# Patient Record
Sex: MALE | Race: WHITE | Employment: UNEMPLOYED | URBAN - METROPOLITAN AREA
[De-identification: names, ages, dates, MRNs, and addresses within clinical notes are randomized per-mention and may not be internally consistent; named-entity substitution may affect disease eponyms.]

---

## 2017-03-17 ENCOUNTER — HOSPITAL ENCOUNTER (OUTPATIENT)
Age: 42
Setting detail: SPECIMEN
Discharge: HOME | End: 2017-03-17
Payer: MEDICAID

## 2017-03-17 LAB
ALBUMIN SERPL-MCNC: 4.6 G/DL (ref 3.5–5.2)
ALBUMIN/GLOBULIN RATIO: 1.4 (ref 1–2.5)
ALP BLD-CCNC: 147 U/L (ref 40–129)
ALT SERPL-CCNC: 24 U/L (ref 5–41)
ANION GAP SERPL CALCULATED.3IONS-SCNC: 18 MMOL/L (ref 9–17)
AST SERPL-CCNC: 25 U/L
BILIRUB SERPL-MCNC: 0.48 MG/DL (ref 0.3–1.2)
BUN BLDV-MCNC: 11 MG/DL (ref 6–20)
BUN/CREAT BLD: ABNORMAL (ref 9–20)
CALCIUM SERPL-MCNC: 10 MG/DL (ref 8.6–10.4)
CARBAMAZEPINE DATE LAST DOSE: NORMAL
CARBAMAZEPINE DOSE AMOUNT: NORMAL
CARBAMAZEPINE DOSE TIME: NORMAL
CARBAMAZEPINE LEVEL: 10.3 UG/ML (ref 4–12)
CHLORIDE BLD-SCNC: 103 MMOL/L (ref 98–107)
CO2: 27 MMOL/L (ref 20–31)
CREAT SERPL-MCNC: 0.68 MG/DL (ref 0.7–1.2)
GFR AFRICAN AMERICAN: >60 ML/MIN
GFR NON-AFRICAN AMERICAN: >60 ML/MIN
GFR SERPL CREATININE-BSD FRML MDRD: ABNORMAL ML/MIN/{1.73_M2}
GFR SERPL CREATININE-BSD FRML MDRD: ABNORMAL ML/MIN/{1.73_M2}
GLUCOSE BLD-MCNC: 87 MG/DL (ref 70–99)
HCT VFR BLD CALC: 46.8 % (ref 41–53)
HEMOGLOBIN: 15.6 G/DL (ref 13.5–17.5)
MCH RBC QN AUTO: 29.4 PG (ref 26–34)
MCHC RBC AUTO-ENTMCNC: 33.3 G/DL (ref 31–37)
MCV RBC AUTO: 88.3 FL (ref 80–100)
PDW BLD-RTO: 13.6 % (ref 12.5–15.4)
PLATELET # BLD: 278 K/UL (ref 140–450)
PMV BLD AUTO: 9.1 FL (ref 6–12)
POTASSIUM SERPL-SCNC: 5.6 MMOL/L (ref 3.7–5.3)
RBC # BLD: 5.3 M/UL (ref 4.5–5.9)
SODIUM BLD-SCNC: 148 MMOL/L (ref 135–144)
TOTAL PROTEIN: 8 G/DL (ref 6.4–8.3)
WBC # BLD: 5 K/UL (ref 3.5–11)

## 2017-03-17 PROCEDURE — 80053 COMPREHEN METABOLIC PANEL: CPT

## 2017-03-17 PROCEDURE — 80156 ASSAY CARBAMAZEPINE TOTAL: CPT

## 2017-03-17 PROCEDURE — 36415 COLL VENOUS BLD VENIPUNCTURE: CPT

## 2017-03-17 PROCEDURE — P9603 ONE-WAY ALLOW PRORATED MILES: HCPCS

## 2017-03-17 PROCEDURE — 85027 COMPLETE CBC AUTOMATED: CPT

## 2017-07-18 ENCOUNTER — HOSPITAL ENCOUNTER (OUTPATIENT)
Age: 42
Setting detail: SPECIMEN
Discharge: HOME | End: 2017-07-18
Payer: MEDICAID

## 2017-07-18 LAB
ALBUMIN SERPL-MCNC: 4.5 G/DL (ref 3.5–5.2)
ALBUMIN/GLOBULIN RATIO: 1.3 (ref 1–2.5)
ALP BLD-CCNC: 152 U/L (ref 40–129)
ALT SERPL-CCNC: 22 U/L (ref 5–41)
ANION GAP SERPL CALCULATED.3IONS-SCNC: 14 MMOL/L (ref 9–17)
AST SERPL-CCNC: 25 U/L
BILIRUB SERPL-MCNC: 0.42 MG/DL (ref 0.3–1.2)
BUN BLDV-MCNC: 11 MG/DL (ref 6–20)
BUN/CREAT BLD: ABNORMAL (ref 9–20)
CALCIUM SERPL-MCNC: 9.8 MG/DL (ref 8.6–10.4)
CARBAMAZEPINE DATE LAST DOSE: NORMAL
CARBAMAZEPINE DOSE AMOUNT: NORMAL
CARBAMAZEPINE DOSE TIME: NORMAL
CARBAMAZEPINE LEVEL: 10.4 UG/ML (ref 4–12)
CHLORIDE BLD-SCNC: 101 MMOL/L (ref 98–107)
CO2: 27 MMOL/L (ref 20–31)
CREAT SERPL-MCNC: 0.65 MG/DL (ref 0.7–1.2)
GFR AFRICAN AMERICAN: >60 ML/MIN
GFR NON-AFRICAN AMERICAN: >60 ML/MIN
GFR SERPL CREATININE-BSD FRML MDRD: ABNORMAL ML/MIN/{1.73_M2}
GFR SERPL CREATININE-BSD FRML MDRD: ABNORMAL ML/MIN/{1.73_M2}
GLUCOSE BLD-MCNC: 76 MG/DL (ref 70–99)
HCT VFR BLD CALC: 45.9 % (ref 41–53)
HEMOGLOBIN: 15 G/DL (ref 13.5–17.5)
MCH RBC QN AUTO: 29.3 PG (ref 26–34)
MCHC RBC AUTO-ENTMCNC: 32.7 G/DL (ref 31–37)
MCV RBC AUTO: 89.8 FL (ref 80–100)
PDW BLD-RTO: 13.4 % (ref 12.5–15.4)
PLATELET # BLD: 276 K/UL (ref 140–450)
PMV BLD AUTO: 9.3 FL (ref 6–12)
POTASSIUM SERPL-SCNC: 5.6 MMOL/L (ref 3.7–5.3)
RBC # BLD: 5.11 M/UL (ref 4.5–5.9)
SODIUM BLD-SCNC: 142 MMOL/L (ref 135–144)
TOTAL PROTEIN: 8 G/DL (ref 6.4–8.3)
WBC # BLD: 4.8 K/UL (ref 3.5–11)

## 2017-07-18 PROCEDURE — 80156 ASSAY CARBAMAZEPINE TOTAL: CPT

## 2017-07-18 PROCEDURE — 80053 COMPREHEN METABOLIC PANEL: CPT

## 2017-07-18 PROCEDURE — 85027 COMPLETE CBC AUTOMATED: CPT

## 2017-07-18 PROCEDURE — 36415 COLL VENOUS BLD VENIPUNCTURE: CPT

## 2017-07-21 ENCOUNTER — HOSPITAL ENCOUNTER (OUTPATIENT)
Age: 42
Setting detail: SPECIMEN
Discharge: HOME | End: 2017-07-21
Payer: MEDICAID

## 2017-07-21 LAB — POTASSIUM SERPL-SCNC: 5 MMOL/L (ref 3.7–5.3)

## 2017-07-21 PROCEDURE — 36415 COLL VENOUS BLD VENIPUNCTURE: CPT

## 2017-07-21 PROCEDURE — 84132 ASSAY OF SERUM POTASSIUM: CPT

## 2017-08-29 ENCOUNTER — HOSPITAL ENCOUNTER (OUTPATIENT)
Age: 42
Setting detail: SPECIMEN
Discharge: HOME | End: 2017-08-29
Payer: MEDICAID

## 2017-08-29 LAB — POTASSIUM SERPL-SCNC: 4 MMOL/L (ref 3.7–5.3)

## 2017-08-29 PROCEDURE — P9603 ONE-WAY ALLOW PRORATED MILES: HCPCS

## 2017-08-29 PROCEDURE — 84132 ASSAY OF SERUM POTASSIUM: CPT

## 2017-08-29 PROCEDURE — 36415 COLL VENOUS BLD VENIPUNCTURE: CPT

## 2017-11-21 ENCOUNTER — HOSPITAL ENCOUNTER (OUTPATIENT)
Age: 42
Setting detail: SPECIMEN
Discharge: HOME | End: 2017-11-21
Payer: MEDICAID

## 2017-11-21 LAB
ALBUMIN SERPL-MCNC: 4 G/DL (ref 3.5–5.2)
ALBUMIN/GLOBULIN RATIO: 1.1 (ref 1–2.5)
ALP BLD-CCNC: 123 U/L (ref 40–129)
ALT SERPL-CCNC: 21 U/L (ref 5–41)
ANION GAP SERPL CALCULATED.3IONS-SCNC: 16 MMOL/L (ref 9–17)
AST SERPL-CCNC: 33 U/L
BILIRUB SERPL-MCNC: 0.39 MG/DL (ref 0.3–1.2)
BUN BLDV-MCNC: 9 MG/DL (ref 6–20)
BUN/CREAT BLD: ABNORMAL (ref 9–20)
CALCIUM SERPL-MCNC: 9.2 MG/DL (ref 8.6–10.4)
CARBAMAZEPINE DATE LAST DOSE: ABNORMAL
CARBAMAZEPINE DOSE AMOUNT: ABNORMAL
CARBAMAZEPINE DOSE TIME: ABNORMAL
CARBAMAZEPINE LEVEL: 12.7 UG/ML (ref 4–12)
CHLORIDE BLD-SCNC: 101 MMOL/L (ref 98–107)
CO2: 22 MMOL/L (ref 20–31)
CREAT SERPL-MCNC: 0.52 MG/DL (ref 0.7–1.2)
GFR AFRICAN AMERICAN: >60 ML/MIN
GFR NON-AFRICAN AMERICAN: >60 ML/MIN
GFR SERPL CREATININE-BSD FRML MDRD: ABNORMAL ML/MIN/{1.73_M2}
GFR SERPL CREATININE-BSD FRML MDRD: ABNORMAL ML/MIN/{1.73_M2}
GLUCOSE BLD-MCNC: 78 MG/DL (ref 70–99)
HCT VFR BLD CALC: 46 % (ref 40.7–50.3)
HEMOGLOBIN: 13.9 G/DL (ref 13–17)
MCH RBC QN AUTO: 30.1 PG (ref 25.2–33.5)
MCHC RBC AUTO-ENTMCNC: 30.2 G/DL (ref 28.4–34.8)
MCV RBC AUTO: 99.6 FL (ref 82.6–102.9)
PDW BLD-RTO: 12.3 % (ref 11.8–14.4)
PLATELET # BLD: 235 K/UL (ref 138–453)
PMV BLD AUTO: 11.1 FL (ref 8.1–13.5)
POTASSIUM SERPL-SCNC: 5 MMOL/L (ref 3.7–5.3)
RBC # BLD: 4.62 M/UL (ref 4.21–5.77)
SODIUM BLD-SCNC: 139 MMOL/L (ref 135–144)
TOTAL PROTEIN: 7.5 G/DL (ref 6.4–8.3)
WBC # BLD: 4.1 K/UL (ref 3.5–11.3)

## 2017-11-21 PROCEDURE — 80053 COMPREHEN METABOLIC PANEL: CPT

## 2017-11-21 PROCEDURE — 36415 COLL VENOUS BLD VENIPUNCTURE: CPT

## 2017-11-21 PROCEDURE — 85027 COMPLETE CBC AUTOMATED: CPT

## 2017-11-21 PROCEDURE — P9603 ONE-WAY ALLOW PRORATED MILES: HCPCS

## 2017-11-21 PROCEDURE — 80156 ASSAY CARBAMAZEPINE TOTAL: CPT

## 2018-01-30 ENCOUNTER — OFFICE VISIT (OUTPATIENT)
Dept: PEDIATRIC NEUROLOGY | Age: 43
End: 2018-01-30
Payer: MEDICAID

## 2018-01-30 VITALS — HEIGHT: 78 IN | BODY MASS INDEX: 16.29 KG/M2 | WEIGHT: 140.8 LBS

## 2018-01-30 DIAGNOSIS — R25.2 SPASTICITY: ICD-10-CM

## 2018-01-30 DIAGNOSIS — G80.3 CHOREOATHETOID CEREBRAL PALSY (HCC): ICD-10-CM

## 2018-01-30 DIAGNOSIS — G40.909 NONINTRACTABLE EPILEPSY WITHOUT STATUS EPILEPTICUS, UNSPECIFIED EPILEPSY TYPE (HCC): Primary | ICD-10-CM

## 2018-01-30 PROCEDURE — 99214 OFFICE O/P EST MOD 30 MIN: CPT

## 2018-01-30 PROCEDURE — 99215 OFFICE O/P EST HI 40 MIN: CPT | Performed by: PSYCHIATRY & NEUROLOGY

## 2018-01-30 NOTE — LETTER
developmental delays. Hematological: Negative. Psychiatric/Behavioral: Negative for behavioral issues, Negative for ADHD     All other systems reviewed and are negative. Past, social, family, and developmental history was reviewed and unchanged. OBJECTIVE:   PHYSICAL EXAM:  Ht (!) 8' 2.42\" (2.5 m)   Wt 140 lb 12.8 oz (63.9 kg)   BMI 10.22 kg/m²    Neurological: he is alert. he displays no atrophy, no tremor. No cranial nerve deficit. he displays no seizure activity. Deisy Cruz was sitting in his wheelchair and making unintelligible noises during today's visit. He was compliant with his examination. Mild amount of spasticity was noted all over in the upper extremities with no restriction of range of motion. He was able to move his hands above his shoulder level. He was able to lift his lower extremities in the air, but there was significant spasticity noted in the hamstrings which made it difficult to extend the lower extremity. He continued to exhibits some lip smacking movements and smiled at the examiners. Reflex Scores: 3+ diffuse. No focal weakness noted on exam.    Nursing note and vitals reviewed. Constitutional: he appears well-developed and well-nourished. HENT: Mouth/Throat: Mucous membranes are moist.   Eyes: EOM are normal. Pupils are equal, round, and reactive to light. Fundoscopic exam reveals sharp discs bilaterally. Neck: Normal range of motion. Neck supple. Cardiovascular: Regular rhythm, S1 normal and S2 normal.   Pulmonary/Chest: Effort normal and breath sounds normal.   Lymph Nodes: No significant lymphadenopathy noted. Musculoskeletal: Normal range of motion. Neurological: he is alert and rest of the exam is as mentioned above. Skin: Skin is warm and dry. No lesions or ulcers. RECORD REVIEW: Previous medical records were reviewed at today's visit.   DIAGNOSTIC STUDIES:  11/15/2015 - EEG - This is an abnormal EEG due to generalized slowing as AST Latest Ref Range: <40 U/L 33   Bilirubin Latest Ref Range: 0.3 - 1.2 mg/dL 0.39   Carbamazepine Lvl Latest Ref Range: 4.0 - 12.0 ug/mL 12.7 (H)   WBC Latest Ref Range: 3.5 - 11.3 k/uL 4.1   RBC Latest Ref Range: 4.21 - 5.77 m/uL 4.62   Hemoglobin Quant Latest Ref Range: 13.0 - 17.0 g/dL 13.9   Hematocrit Latest Ref Range: 40.7 - 50.3 % 46.0   Platelet Count Latest Ref Range: 138 - 453 k/uL 235     ***CSM    ASSESSMENT:   Aline Santiago is a 36 y.o. male with:  1. Epilepsy with the last reported seizure occurring in May 1994.   2. Choreoathetoid cerebral palsy   3. Static Encephalopathy  4. Severe Spastic Quadriparesis  5. Spasticity     PLAN:   1. Continue Tegretol at 800 mg twice daily. 2. Continue Zanaflex at 4 mg three times a day. 3. The use of Diastat 12.5 mg to be administered rectally for seizures lasting more than 3 minutes was discussed with the caregiver. 4. I would like to get an EEG to evaluate for epileptiform activity. 5. Blood work including CBC, AST, ALT, Lytes and Tegretol levels is also recommended every 4 months. 6. Seizure precautions were recommended to be maintained. The caregiver was instructed to notify our clinic if Maddi Mars has any breakthrough seizures for an earlier appointment. 7. Anticipatory guidance and preventative counseling was provided regarding seizure precautions; and first aid measures during seizures was discussed in detail. 8. I plan to see Maddi Mars back in 6 months or earlier if needed. Written by Marycruz Jean RN acting as scribe for Dr. Naina Rashid. 1/30/2018  2:00 PM       I have reviewed and made changes accordingly to the work scribed by Marycruz Jean RN. The documentation accurately reflects work and decisions made by me. Esdras Rollins MD   Pediatric Neurology & Epilepsy  1/30/2018              If you have any questions or concerns, please feel free to call me. Thank you again for referring this patient to be seen in our clinic.     Sincerely,

## 2018-01-30 NOTE — PROGRESS NOTES
Hematocrit Latest Ref Range: 40.7 - 50.3 % 46.0   Platelet Count Latest Ref Range: 138 - 453 k/uL 235     Controlled Substances Monitoring:     Attestation: The Prescription Monitoring Report for this patient was reviewed today. (Efra Balbuena MD)  Documentation: No signs of potential drug abuse or diversion identified. Efra Balbuena MD)    ASSESSMENT:   Minh Lara is a 36 y.o. male with:  1. Epilepsy with the last reported seizure occurring in May 1994.   2. Choreoathetoid cerebral palsy   3. Static Encephalopathy  4. Severe Spastic Quadriparesis  5. Spasticity, which remains a concern. PLAN:   1. Continue Tegretol at 800 mg twice daily. 2. Continue Zanaflex but increase the dose to 6 mg three times a day since the spasticity continues to remain a concern. 3. The use of Diastat 12.5 mg to be administered rectally for seizures lasting more than 3 minutes was discussed with the caregiver. 4. I would like to get an EEG to evaluate for epileptiform activity. 5. Blood work including CBC, AST, ALT, Lytes and Tegretol levels is also recommended every 4 months. 6. Seizure precautions were recommended to be maintained. The caregiver was instructed to notify our clinic if Chip Maki has any breakthrough seizures for an earlier appointment. 7. Anticipatory guidance and preventative counseling was provided regarding seizure precautions; and first aid measures during seizures was discussed in detail. 8. I plan to see Chip Maki back in 6 months or earlier if needed. Written by Malia Hughes RN acting as scribe for Dr. Laurel Raymond. 1/30/2018  2:00 PM       I have reviewed and made changes accordingly to the work scribed by Malia Hughes RN. The documentation accurately reflects work and decisions made by me.     Dante Morgan MD   Pediatric Neurology & Epilepsy  1/30/2018

## 2018-01-30 NOTE — LETTER
Neurological: Negative for headaches, positive for seizures, positive for developmental delays. Hematological: Negative. Psychiatric/Behavioral: Negative for behavioral issues, Negative for ADHD     All other systems reviewed and are negative. Past, social, family, and developmental history was reviewed and unchanged. OBJECTIVE:   PHYSICAL EXAM:  Ht (!) 8' 2.42\" (2.5 m)   Wt 140 lb 12.8 oz (63.9 kg)   BMI 10.22 kg/m²    Neurological: he is alert. he displays no atrophy, no tremor. No cranial nerve deficit. he displays no seizure activity. Mild amount of spasticity was noted all over in the upper extremities with no restriction of range of motion. He was able to move his hands above his shoulder level. He was able to lift his lower extremities in the air, but there was significant spasticity noted in the hamstrings which made it difficult to extend the lower extremity. He continued to exhibits some lip smacking movements and smiled at the examiners. The tone was noted to be increased in the extremities. He made unintelligible sounds and shouted sometimes during the visit. Deisy Cruz was sitting in his wheelchair. Reflex Scores: 3+ diffuse. No focal weakness noted on exam.    Nursing note and vitals reviewed. Constitutional: he appears well-developed and well-nourished. HENT: Mouth/Throat: Mucous membranes are moist.   Eyes: EOM are normal. Pupils are equal, round, and reactive to light. Fundoscopic exam reveals sharp discs bilaterally. Neck: Normal range of motion. Neck supple. Cardiovascular: Regular rhythm, S1 normal and S2 normal.   Pulmonary/Chest: Effort normal and breath sounds normal.   Lymph Nodes: No significant lymphadenopathy noted. Musculoskeletal: Normal range of motion. Neurological: he is alert and rest of the exam is as mentioned above. Skin: Skin is warm and dry. No lesions or ulcers. RECORD REVIEW: Previous medical records were reviewed at today's visit.

## 2018-03-01 ENCOUNTER — PROCEDURE VISIT (OUTPATIENT)
Dept: PEDIATRIC NEUROLOGY | Age: 43
End: 2018-03-01
Payer: MEDICAID

## 2018-03-01 DIAGNOSIS — G40.909 NONINTRACTABLE EPILEPSY WITHOUT STATUS EPILEPTICUS, UNSPECIFIED EPILEPSY TYPE (HCC): Primary | Chronic | ICD-10-CM

## 2018-03-01 PROCEDURE — 95816 EEG AWAKE AND DROWSY: CPT | Performed by: PSYCHIATRY & NEUROLOGY

## 2018-03-02 ENCOUNTER — TELEPHONE (OUTPATIENT)
Dept: PEDIATRIC NEUROLOGY | Age: 43
End: 2018-03-02

## 2018-03-23 ENCOUNTER — HOSPITAL ENCOUNTER (OUTPATIENT)
Age: 43
Setting detail: SPECIMEN
Discharge: HOME | End: 2018-03-23
Payer: MEDICAID

## 2018-03-23 LAB
ALBUMIN SERPL-MCNC: 4.3 G/DL (ref 3.5–5.2)
ALBUMIN/GLOBULIN RATIO: 1.2 (ref 1–2.5)
ALP BLD-CCNC: 118 U/L (ref 40–129)
ALT SERPL-CCNC: 23 U/L (ref 5–41)
ANION GAP SERPL CALCULATED.3IONS-SCNC: 14 MMOL/L (ref 9–17)
AST SERPL-CCNC: 28 U/L
BILIRUB SERPL-MCNC: 0.41 MG/DL (ref 0.3–1.2)
BUN BLDV-MCNC: 12 MG/DL (ref 6–20)
BUN/CREAT BLD: ABNORMAL (ref 9–20)
CALCIUM SERPL-MCNC: 9.5 MG/DL (ref 8.6–10.4)
CARBAMAZEPINE DATE LAST DOSE: NORMAL
CARBAMAZEPINE DOSE AMOUNT: NORMAL
CARBAMAZEPINE DOSE TIME: NORMAL
CARBAMAZEPINE LEVEL: 9.1 UG/ML (ref 4–12)
CHLORIDE BLD-SCNC: 103 MMOL/L (ref 98–107)
CO2: 27 MMOL/L (ref 20–31)
CREAT SERPL-MCNC: 0.62 MG/DL (ref 0.7–1.2)
GFR AFRICAN AMERICAN: >60 ML/MIN
GFR NON-AFRICAN AMERICAN: >60 ML/MIN
GFR SERPL CREATININE-BSD FRML MDRD: ABNORMAL ML/MIN/{1.73_M2}
GFR SERPL CREATININE-BSD FRML MDRD: ABNORMAL ML/MIN/{1.73_M2}
GLUCOSE BLD-MCNC: 67 MG/DL (ref 70–99)
HCT VFR BLD CALC: 49 % (ref 40.7–50.3)
HEMOGLOBIN: 15 G/DL (ref 13–17)
MCH RBC QN AUTO: 28.8 PG (ref 25.2–33.5)
MCHC RBC AUTO-ENTMCNC: 30.6 G/DL (ref 28.4–34.8)
MCV RBC AUTO: 94.2 FL (ref 82.6–102.9)
NRBC AUTOMATED: 0 PER 100 WBC
PDW BLD-RTO: 12.5 % (ref 11.8–14.4)
PLATELET # BLD: 269 K/UL (ref 138–453)
PMV BLD AUTO: 11.5 FL (ref 8.1–13.5)
POTASSIUM SERPL-SCNC: 5.1 MMOL/L (ref 3.7–5.3)
RBC # BLD: 5.2 M/UL (ref 4.21–5.77)
SODIUM BLD-SCNC: 144 MMOL/L (ref 135–144)
TOTAL PROTEIN: 7.8 G/DL (ref 6.4–8.3)
WBC # BLD: 4.1 K/UL (ref 3.5–11.3)

## 2018-03-23 PROCEDURE — 85027 COMPLETE CBC AUTOMATED: CPT

## 2018-03-23 PROCEDURE — 36415 COLL VENOUS BLD VENIPUNCTURE: CPT

## 2018-03-23 PROCEDURE — P9603 ONE-WAY ALLOW PRORATED MILES: HCPCS

## 2018-03-23 PROCEDURE — 80156 ASSAY CARBAMAZEPINE TOTAL: CPT

## 2018-03-23 PROCEDURE — 80053 COMPREHEN METABOLIC PANEL: CPT

## 2018-05-11 ENCOUNTER — HOSPITAL ENCOUNTER (OUTPATIENT)
Age: 43
Setting detail: SPECIMEN
Discharge: HOME | End: 2018-05-11
Payer: MEDICAID

## 2018-05-11 LAB
ALBUMIN SERPL-MCNC: 4 G/DL (ref 3.5–5.2)
ALBUMIN/GLOBULIN RATIO: 1.2 (ref 1–2.5)
ALP BLD-CCNC: 99 U/L (ref 40–129)
ALT SERPL-CCNC: 38 U/L (ref 5–41)
AST SERPL-CCNC: 33 U/L
BILIRUB SERPL-MCNC: 0.3 MG/DL (ref 0.3–1.2)
BILIRUBIN DIRECT: <0.08 MG/DL
BILIRUBIN, INDIRECT: NORMAL MG/DL (ref 0–1)
GLOBULIN: NORMAL G/DL (ref 1.5–3.8)
TOTAL PROTEIN: 7.4 G/DL (ref 6.4–8.3)

## 2018-05-11 PROCEDURE — 80076 HEPATIC FUNCTION PANEL: CPT

## 2018-05-11 PROCEDURE — P9603 ONE-WAY ALLOW PRORATED MILES: HCPCS

## 2018-05-11 PROCEDURE — 36415 COLL VENOUS BLD VENIPUNCTURE: CPT

## 2018-06-29 ENCOUNTER — HOSPITAL ENCOUNTER (OUTPATIENT)
Age: 43
Setting detail: SPECIMEN
Discharge: HOME | End: 2018-06-29
Payer: MEDICARE

## 2018-06-29 LAB
ALBUMIN SERPL-MCNC: 4.5 G/DL (ref 3.5–5.2)
ALBUMIN/GLOBULIN RATIO: 1.3 (ref 1–2.5)
ALP BLD-CCNC: 119 U/L (ref 40–129)
ALT SERPL-CCNC: 27 U/L (ref 5–41)
ANION GAP SERPL CALCULATED.3IONS-SCNC: 12 MMOL/L (ref 9–17)
AST SERPL-CCNC: 29 U/L
BILIRUB SERPL-MCNC: 0.33 MG/DL (ref 0.3–1.2)
BUN BLDV-MCNC: 14 MG/DL (ref 6–20)
BUN/CREAT BLD: ABNORMAL (ref 9–20)
CALCIUM SERPL-MCNC: 9.7 MG/DL (ref 8.6–10.4)
CARBAMAZEPINE DATE LAST DOSE: NORMAL
CARBAMAZEPINE DOSE AMOUNT: NORMAL
CARBAMAZEPINE DOSE TIME: NORMAL
CARBAMAZEPINE LEVEL: 11.5 UG/ML (ref 4–12)
CHLORIDE BLD-SCNC: 108 MMOL/L (ref 98–107)
CO2: 27 MMOL/L (ref 20–31)
CREAT SERPL-MCNC: 0.69 MG/DL (ref 0.7–1.2)
GFR AFRICAN AMERICAN: >60 ML/MIN
GFR NON-AFRICAN AMERICAN: >60 ML/MIN
GFR SERPL CREATININE-BSD FRML MDRD: ABNORMAL ML/MIN/{1.73_M2}
GFR SERPL CREATININE-BSD FRML MDRD: ABNORMAL ML/MIN/{1.73_M2}
GLUCOSE BLD-MCNC: 72 MG/DL (ref 70–99)
HCT VFR BLD CALC: 49.3 % (ref 40.7–50.3)
HEMOGLOBIN: 15 G/DL (ref 13–17)
MCH RBC QN AUTO: 29.4 PG (ref 25.2–33.5)
MCHC RBC AUTO-ENTMCNC: 30.4 G/DL (ref 28.4–34.8)
MCV RBC AUTO: 96.7 FL (ref 82.6–102.9)
NRBC AUTOMATED: 0 PER 100 WBC
PDW BLD-RTO: 12.5 % (ref 11.8–14.4)
PLATELET # BLD: 280 K/UL (ref 138–453)
PMV BLD AUTO: 11.3 FL (ref 8.1–13.5)
POTASSIUM SERPL-SCNC: 5.6 MMOL/L (ref 3.7–5.3)
RBC # BLD: 5.1 M/UL (ref 4.21–5.77)
SODIUM BLD-SCNC: 147 MMOL/L (ref 135–144)
TOTAL PROTEIN: 8 G/DL (ref 6.4–8.3)
WBC # BLD: 4.8 K/UL (ref 3.5–11.3)

## 2018-06-29 PROCEDURE — 80053 COMPREHEN METABOLIC PANEL: CPT

## 2018-06-29 PROCEDURE — 36415 COLL VENOUS BLD VENIPUNCTURE: CPT

## 2018-06-29 PROCEDURE — 85027 COMPLETE CBC AUTOMATED: CPT

## 2018-06-29 PROCEDURE — P9603 ONE-WAY ALLOW PRORATED MILES: HCPCS

## 2018-06-29 PROCEDURE — 80156 ASSAY CARBAMAZEPINE TOTAL: CPT

## 2018-08-01 ENCOUNTER — OFFICE VISIT (OUTPATIENT)
Dept: PEDIATRIC NEUROLOGY | Age: 43
End: 2018-08-01
Payer: MEDICARE

## 2018-08-01 VITALS
HEIGHT: 64 IN | WEIGHT: 140.8 LBS | BODY MASS INDEX: 24.04 KG/M2 | SYSTOLIC BLOOD PRESSURE: 148 MMHG | DIASTOLIC BLOOD PRESSURE: 80 MMHG | HEART RATE: 124 BPM

## 2018-08-01 DIAGNOSIS — G40.B09 NONINTRACTABLE JUVENILE MYOCLONIC EPILEPSY WITHOUT STATUS EPILEPTICUS (HCC): Primary | Chronic | ICD-10-CM

## 2018-08-01 DIAGNOSIS — R25.2 SPASTICITY: ICD-10-CM

## 2018-08-01 DIAGNOSIS — G80.3 CHOREOATHETOID CEREBRAL PALSY (HCC): ICD-10-CM

## 2018-08-01 DIAGNOSIS — G82.50 SPASTIC QUADRIPLEGIA (HCC): Chronic | ICD-10-CM

## 2018-08-01 PROCEDURE — 99215 OFFICE O/P EST HI 40 MIN: CPT | Performed by: PSYCHIATRY & NEUROLOGY

## 2018-08-01 PROCEDURE — G8420 CALC BMI NORM PARAMETERS: HCPCS | Performed by: PSYCHIATRY & NEUROLOGY

## 2018-08-01 PROCEDURE — G8427 DOCREV CUR MEDS BY ELIG CLIN: HCPCS | Performed by: PSYCHIATRY & NEUROLOGY

## 2018-08-01 PROCEDURE — 99214 OFFICE O/P EST MOD 30 MIN: CPT | Performed by: PSYCHIATRY & NEUROLOGY

## 2018-08-01 PROCEDURE — 1036F TOBACCO NON-USER: CPT | Performed by: PSYCHIATRY & NEUROLOGY

## 2018-08-01 NOTE — PROGRESS NOTES
SUBJECTIVE:   It was a pleasure to see Kandice Claude  in the Pediatric Neurology Clinic at [facility]. He is a 36 y.o. male accompanied by his caregiver Joshua Aleman to this visit for a follow-up neurological evaluation. INTERIM PROGRESS:  EPILEPSY:   Joshua Aleman denies witnessing any seizures since the last visit in January 2018. Medical records indicate that Ted Flemnig has been seizure-free since 1994. Ted Fleming continues to take Tegretol in this regard with no reported side effects or concerns. An EEG was completed in March 2018 which was abnormal which was abnormal due to generalized slowing as well as epileptiform discharges.  The generalized slowing is suggestive of diffuse neuronal dysfunction.  In addition, there were occasional sharp waves seen in the right frontal, central, and parietal regions.  These waveforms are considered epileptiform in nature and suggest the presence of an epileptogenic focus as well as an increased risk of partial seizures in the future. Seizure description is unavailable however it is reported that they are grand mal in presentation. CEREBRAL PALSY:  Joshua Aleman states that Ted Fleming continues to have issues with spasticity. Joshua Aleman states that Ted Fleming continues to be involved in physical and occupational therapies as needed. She states that most of his therapies are in his room. He continues to require assistance with feeding. He is unable to walk however he will roll on the floor. Joshua Aleman states that Ted Fleming is non-verbal and needs assistance with activities of daily living. He continues to take Zanaflex in this regard with no reported side effects or concerns. Previous Medications Tried: Baclofen (ineffective)    REVIEW OF SYSTEMS:  Constitutional: Negative. Eyes: Legally blind. Respiratory: Negative. Cardiovascular: Negative. Gastrointestinal: Negative. Genitourinary: Negative. Musculoskeletal: Positive for spastic Quadriparesis. Skin: Negative.    Neurological: records were reviewed at today's visit. DIAGNOSTIC STUDIES:  11/15/2015 - EEG - This is an abnormal EEG due to generalized slowing as well as epileptiform discharges.  The generalized slowing is suggestive of diffuse neuronal dysfunction.  In addition, there  were focal discharges seen during the study as mentioned above. These waveforms are considered to be epileptiform in nature.  This constellation of findings is consistent with a partial epileptogenic  abnormality and an increased risk of seizures in the future. 12/10/2015 - MRI Brain - Marked white matter volume loss throughout both cerebral hemispheres with marked thinning of the corpus callosum and enlarged lateral ventricles with undulating borders and mild increased signal intensity on FLAIR sequence in the periventricular white matter. 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 Head of the mandibular condyles are markedly enlarged bilaterally. No associated edema or joint effusions evident. Visualized muscles of mastication do not appear enlarged. This is of indeterminate etiology. Correlate for any symptoms referrable to this area. Small amount of fluid right mastoid air cells. Correlate for signs or symptoms of infection. 03/01/2018 - EEG - Abnormal due to generalized slowing as well as epileptiform discharges.  The generalized slowing is suggestive of diffuse neuronal dysfunction.  In addition, there were occasional sharp waves seen in the right frontal, central, and parietal regions.  These waveforms are considered epileptiform in nature and suggest the presence of an epileptogenic focus as well as an increased risk of partial seizures in the future. Ref.  Range 6/29/2018 06:41   Sodium Latest Ref Range: 135 - 144 mmol/L 147 (H)   Potassium Latest Ref Range: 3.7 - 5.3 mmol/L 5.6 (H)   Chloride Latest Ref Range: 98 - 107 mmol/L 108 (H)   CO2 Latest Ref Range: 20 - 31 mmol/L 27   BUN Latest Ref Range: 6 - 20 mg/dL 14   Creatinine Latest Ref Range: 0.70 - 1.20 mg/dL 0.69 (L)   Anion Gap Latest Ref Range: 9 - 17 mmol/L 12   GFR Non- Latest Ref Range: >60 mL/min >60   GFR African American Latest Ref Range: >60 mL/min >60   Glucose Latest Ref Range: 70 - 99 mg/dL 72   Calcium Latest Ref Range: 8.6 - 10.4 mg/dL 9.7   Albumin/Globulin Ratio Latest Ref Range: 1.0 - 2.5  1.3   Total Protein Latest Ref Range: 6.4 - 8.3 g/dL 8.0   Albumin Latest Ref Range: 3.5 - 5.2 g/dL 4.5   Alk Phos Latest Ref Range: 40 - 129 U/L 119   ALT Latest Ref Range: 5 - 41 U/L 27   AST Latest Ref Range: <40 U/L 29   Bilirubin Latest Ref Range: 0.3 - 1.2 mg/dL 0.33   Carbamazepine Lvl Latest Ref Range: 4.0 - 12.0 ug/mL 11.5   WBC Latest Ref Range: 3.5 - 11.3 k/uL 4.8   RBC Latest Ref Range: 4.21 - 5.77 m/uL 5.10   Hemoglobin Quant Latest Ref Range: 13.0 - 17.0 g/dL 15.0   Hematocrit Latest Ref Range: 40.7 - 50.3 % 49.3   Platelet Count Latest Ref Range: 138 - 453 k/uL 280     Controlled Substances Monitoring:   RX Monitoring 8/1/2018   Attestation The Prescription Monitoring Report for this patient was reviewed today. Documentation No signs of potential drug abuse or diversion identified. ASSESSMENT:   Kvng Burns is a 36 y.o. male with:  1. Epilepsy with the last reported seizure occurring in May 1994.   2. Choreoathetoid cerebral palsy   3. Static Encephalopathy  4. Severe Spastic Quadriparesis  5. Spasticity, which continues to remain a concern. PLAN:   1. Continue Tegretol at 800 mg twice daily. 2. Continue Zanaflex but increase the dose to 8 mg three times a day. 3. The use of Diastat 12.5 mg to be administered rectally for seizures lasting more than 3 minutes was discussed with the caregiver. 4. Blood work including CBC, AST, ALT, Lytes and Tegretol levels is also recommended every 4 months. 5. Seizure precautions were recommended to be maintained.  The caregiver was instructed to notify our clinic if Crystal Finch has any breakthrough seizures for an earlier appointment. 6. Anticipatory guidance and preventative counseling was provided regarding seizure precautions; and first aid measures during seizures was discussed in detail. 7. I plan to see Milagrosfred Valencia back in 6 months or earlier if needed. Written by Royal Seymour acting as scribe for Dr. Teresia Kehr. 8/1/2018  2:01 PM    I have reviewed and made changes accordingly to the work scribed by Royal Seymour. The documentation accurately reflects work and decisions made by me.     Holger Ren MD   Pediatric Neurology & Epilepsy  8/1/2018

## 2018-08-01 NOTE — LETTER
He continues to take Zanaflex in this regard with no reported side effects or concerns. Previous Medications Tried: Baclofen (ineffective)    REVIEW OF SYSTEMS:  Constitutional: Negative. Eyes: Legally blind. Respiratory: Negative. Cardiovascular: Negative. Gastrointestinal: Negative. Genitourinary: Negative. Musculoskeletal: Positive for spastic Quadriparesis. Skin: Negative. Neurological: Negative for headaches, positive for seizures, positive for developmental delays. Hematological: Negative. Psychiatric/Behavioral: Negative for behavioral issues, Negative for ADHD     All other systems reviewed and are negative. Past, social, family, and developmental history was reviewed and unchanged. OBJECTIVE:   PHYSICAL EXAM:  BP (!) 148/80   Pulse 124   Ht 5' 3.5\" (1.613 m)   Wt 140 lb 12.8 oz (63.9 kg)   BMI 24.55 kg/m²    Neurological: he is alert. he displays no atrophy, no tremor. No cranial nerve deficit. he displays no seizure activity. Tammi Benton sat in his wheelchair comfortably. He was compliant with examination. He continued to exhibits some lip smacking movements and smiled at the examiners. The tone was noted to be increased in the extremities. He made unintelligible sounds and shouted sometimes during the visit. Mild amount of spasticity was noted all over in the upper extremities with no restriction of range of motion. He was able to move his hands above his shoulder level. He was able to lift his lower extremities in the air, but there was significant spasticity noted in the hamstrings which made it difficult to extend the lower extremity. Reflex Scores: 3+ diffuse. No focal weakness noted on exam.    Nursing note and vitals reviewed. Constitutional: he appears well-developed and well-nourished. HENT: Mouth/Throat: Mucous membranes are moist.   Eyes: EOM are normal. Pupils are equal, round, and reactive to light. Fundoscopic exam reveals sharp discs bilaterally. Neck: Normal range of motion. Neck supple. Cardiovascular: Regular rhythm, S1 normal and S2 normal.   Pulmonary/Chest: Effort normal and breath sounds normal.   Lymph Nodes: No significant lymphadenopathy noted. Musculoskeletal: Normal range of motion. Neurological: he is alert and rest of the exam is as mentioned above. Skin: Skin is warm and dry. No lesions or ulcers. RECORD REVIEW: Previous medical records were reviewed at today's visit. DIAGNOSTIC STUDIES:  11/15/2015 - EEG - This is an abnormal EEG due to generalized slowing as well as epileptiform discharges.  The generalized slowing is suggestive of diffuse neuronal dysfunction.  In addition, there  were focal discharges seen during the study as mentioned above. These waveforms are considered to be epileptiform in nature.  This constellation of findings is consistent with a partial epileptogenic  abnormality and an increased risk of seizures in the future. 12/10/2015 - MRI Brain - Marked white matter volume loss throughout both cerebral hemispheres with marked thinning of the corpus callosum and enlarged lateral ventricles with undulating borders and mild increased signal intensity on FLAIR sequence in the periventricular white matter. 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 Head of the mandibular condyles are markedly enlarged bilaterally. No associated edema or joint effusions evident. Visualized muscles of mastication do not appear enlarged. This is of indeterminate etiology. Correlate for any symptoms referrable to this area. Small amount of fluid right mastoid air cells. Correlate for signs or symptoms of infection.   03/01/2018 - EEG - Abnormal due to generalized slowing as well as epileptiform discharges.  The generalized slowing is suggestive of diffuse neuronal dysfunction.  In addition, there were occasional sharp waves seen in the right frontal, central, and parietal regions.  These waveforms are

## 2018-11-06 ENCOUNTER — HOSPITAL ENCOUNTER (OUTPATIENT)
Age: 43
Setting detail: SPECIMEN
Discharge: HOME | End: 2018-11-06
Payer: MEDICARE

## 2018-11-06 LAB
ALBUMIN SERPL-MCNC: 4.4 G/DL (ref 3.5–5.2)
ALBUMIN/GLOBULIN RATIO: 1.3 (ref 1–2.5)
ALP BLD-CCNC: 132 U/L (ref 40–129)
ALT SERPL-CCNC: 47 U/L (ref 5–41)
ANION GAP SERPL CALCULATED.3IONS-SCNC: 14 MMOL/L (ref 9–17)
AST SERPL-CCNC: 36 U/L
BILIRUB SERPL-MCNC: 0.37 MG/DL (ref 0.3–1.2)
BUN BLDV-MCNC: 13 MG/DL (ref 6–20)
BUN/CREAT BLD: ABNORMAL (ref 9–20)
CALCIUM SERPL-MCNC: 9.6 MG/DL (ref 8.6–10.4)
CARBAMAZEPINE DATE LAST DOSE: NORMAL
CARBAMAZEPINE DOSE AMOUNT: NORMAL
CARBAMAZEPINE DOSE TIME: NORMAL
CARBAMAZEPINE LEVEL: 11.9 UG/ML (ref 4–12)
CHLORIDE BLD-SCNC: 103 MMOL/L (ref 98–107)
CO2: 26 MMOL/L (ref 20–31)
CREAT SERPL-MCNC: 0.69 MG/DL (ref 0.7–1.2)
GFR AFRICAN AMERICAN: >60 ML/MIN
GFR NON-AFRICAN AMERICAN: >60 ML/MIN
GFR SERPL CREATININE-BSD FRML MDRD: ABNORMAL ML/MIN/{1.73_M2}
GFR SERPL CREATININE-BSD FRML MDRD: ABNORMAL ML/MIN/{1.73_M2}
GLUCOSE BLD-MCNC: 70 MG/DL (ref 70–99)
HCT VFR BLD CALC: 49.1 % (ref 40.7–50.3)
HEMOGLOBIN: 15.2 G/DL (ref 13–17)
MCH RBC QN AUTO: 29.6 PG (ref 25.2–33.5)
MCHC RBC AUTO-ENTMCNC: 31 G/DL (ref 28.4–34.8)
MCV RBC AUTO: 95.7 FL (ref 82.6–102.9)
NRBC AUTOMATED: 0 PER 100 WBC
PDW BLD-RTO: 12.4 % (ref 11.8–14.4)
PLATELET # BLD: 265 K/UL (ref 138–453)
PMV BLD AUTO: 10.5 FL (ref 8.1–13.5)
POTASSIUM SERPL-SCNC: 4.8 MMOL/L (ref 3.7–5.3)
RBC # BLD: 5.13 M/UL (ref 4.21–5.77)
SODIUM BLD-SCNC: 143 MMOL/L (ref 135–144)
TOTAL PROTEIN: 7.8 G/DL (ref 6.4–8.3)
WBC # BLD: 6.2 K/UL (ref 3.5–11.3)

## 2018-11-06 PROCEDURE — 36415 COLL VENOUS BLD VENIPUNCTURE: CPT

## 2018-11-06 PROCEDURE — P9603 ONE-WAY ALLOW PRORATED MILES: HCPCS

## 2018-11-06 PROCEDURE — 80156 ASSAY CARBAMAZEPINE TOTAL: CPT

## 2018-11-06 PROCEDURE — 85027 COMPLETE CBC AUTOMATED: CPT

## 2018-11-06 PROCEDURE — 80053 COMPREHEN METABOLIC PANEL: CPT

## 2018-11-13 ENCOUNTER — HOSPITAL ENCOUNTER (OUTPATIENT)
Age: 43
Setting detail: SPECIMEN
Discharge: HOME | End: 2018-11-13
Payer: MEDICARE

## 2018-11-13 LAB
ALBUMIN SERPL-MCNC: 4.6 G/DL (ref 3.5–5.2)
ALBUMIN/GLOBULIN RATIO: 1.4 (ref 1–2.5)
ALP BLD-CCNC: 131 U/L (ref 40–129)
ALT SERPL-CCNC: 31 U/L (ref 5–41)
AST SERPL-CCNC: 26 U/L
BILIRUB SERPL-MCNC: 0.47 MG/DL (ref 0.3–1.2)
BILIRUBIN DIRECT: 0.11 MG/DL
BILIRUBIN, INDIRECT: 0.36 MG/DL (ref 0–1)
GLOBULIN: ABNORMAL G/DL (ref 1.5–3.8)
TOTAL PROTEIN: 7.9 G/DL (ref 6.4–8.3)

## 2018-11-13 PROCEDURE — P9603 ONE-WAY ALLOW PRORATED MILES: HCPCS

## 2018-11-13 PROCEDURE — 80076 HEPATIC FUNCTION PANEL: CPT

## 2018-11-13 PROCEDURE — 36415 COLL VENOUS BLD VENIPUNCTURE: CPT

## 2019-03-08 ENCOUNTER — HOSPITAL ENCOUNTER (OUTPATIENT)
Age: 44
Setting detail: SPECIMEN
Discharge: HOME | End: 2019-03-08
Payer: MEDICARE

## 2019-03-08 LAB
ALBUMIN SERPL-MCNC: 4.2 G/DL (ref 3.5–5.2)
ALBUMIN/GLOBULIN RATIO: 1.4 (ref 1–2.5)
ALP BLD-CCNC: 121 U/L (ref 40–129)
ALT SERPL-CCNC: 19 U/L (ref 5–41)
ANION GAP SERPL CALCULATED.3IONS-SCNC: 13 MMOL/L (ref 9–17)
AST SERPL-CCNC: 21 U/L
BILIRUB SERPL-MCNC: 0.44 MG/DL (ref 0.3–1.2)
BUN BLDV-MCNC: 12 MG/DL (ref 6–20)
BUN/CREAT BLD: ABNORMAL (ref 9–20)
CALCIUM SERPL-MCNC: 9.2 MG/DL (ref 8.6–10.4)
CARBAMAZEPINE DATE LAST DOSE: NORMAL
CARBAMAZEPINE DOSE AMOUNT: NORMAL
CARBAMAZEPINE DOSE TIME: NORMAL
CARBAMAZEPINE LEVEL: 11.6 UG/ML (ref 4–12)
CHLORIDE BLD-SCNC: 106 MMOL/L (ref 98–107)
CO2: 26 MMOL/L (ref 20–31)
CREAT SERPL-MCNC: 0.69 MG/DL (ref 0.7–1.2)
GFR AFRICAN AMERICAN: >60 ML/MIN
GFR NON-AFRICAN AMERICAN: >60 ML/MIN
GFR SERPL CREATININE-BSD FRML MDRD: ABNORMAL ML/MIN/{1.73_M2}
GFR SERPL CREATININE-BSD FRML MDRD: ABNORMAL ML/MIN/{1.73_M2}
GLUCOSE BLD-MCNC: 63 MG/DL (ref 70–99)
HCT VFR BLD CALC: 49.5 % (ref 40.7–50.3)
HEMOGLOBIN: 15.2 G/DL (ref 13–17)
MCH RBC QN AUTO: 29.7 PG (ref 25.2–33.5)
MCHC RBC AUTO-ENTMCNC: 30.7 G/DL (ref 28.4–34.8)
MCV RBC AUTO: 96.9 FL (ref 82.6–102.9)
NRBC AUTOMATED: 0 PER 100 WBC
PDW BLD-RTO: 12.5 % (ref 11.8–14.4)
PLATELET # BLD: 247 K/UL (ref 138–453)
PMV BLD AUTO: 11.1 FL (ref 8.1–13.5)
POTASSIUM SERPL-SCNC: 5.4 MMOL/L (ref 3.7–5.3)
RBC # BLD: 5.11 M/UL (ref 4.21–5.77)
SODIUM BLD-SCNC: 145 MMOL/L (ref 135–144)
TOTAL PROTEIN: 7.3 G/DL (ref 6.4–8.3)
WBC # BLD: 4.6 K/UL (ref 3.5–11.3)

## 2019-03-08 PROCEDURE — 85027 COMPLETE CBC AUTOMATED: CPT

## 2019-03-08 PROCEDURE — 80156 ASSAY CARBAMAZEPINE TOTAL: CPT

## 2019-03-08 PROCEDURE — 36415 COLL VENOUS BLD VENIPUNCTURE: CPT

## 2019-03-08 PROCEDURE — 80053 COMPREHEN METABOLIC PANEL: CPT

## 2019-03-08 PROCEDURE — P9603 ONE-WAY ALLOW PRORATED MILES: HCPCS

## 2019-04-02 ENCOUNTER — OFFICE VISIT (OUTPATIENT)
Dept: PEDIATRIC NEUROLOGY | Age: 44
End: 2019-04-02
Payer: MEDICARE

## 2019-04-02 VITALS
WEIGHT: 140.8 LBS | HEART RATE: 101 BPM | HEIGHT: 64 IN | BODY MASS INDEX: 24.04 KG/M2 | SYSTOLIC BLOOD PRESSURE: 153 MMHG | DIASTOLIC BLOOD PRESSURE: 93 MMHG

## 2019-04-02 DIAGNOSIS — G82.50 SPASTIC QUADRIPLEGIA (HCC): ICD-10-CM

## 2019-04-02 DIAGNOSIS — G80.3 CHOREOATHETOID CEREBRAL PALSY (HCC): ICD-10-CM

## 2019-04-02 DIAGNOSIS — G40.909 NONINTRACTABLE EPILEPSY WITHOUT STATUS EPILEPTICUS, UNSPECIFIED EPILEPSY TYPE (HCC): Primary | ICD-10-CM

## 2019-04-02 PROCEDURE — G8420 CALC BMI NORM PARAMETERS: HCPCS | Performed by: NURSE PRACTITIONER

## 2019-04-02 PROCEDURE — 1036F TOBACCO NON-USER: CPT | Performed by: NURSE PRACTITIONER

## 2019-04-02 PROCEDURE — 99214 OFFICE O/P EST MOD 30 MIN: CPT | Performed by: NURSE PRACTITIONER

## 2019-04-02 PROCEDURE — 99211 OFF/OP EST MAY X REQ PHY/QHP: CPT | Performed by: NURSE PRACTITIONER

## 2019-04-02 PROCEDURE — G8427 DOCREV CUR MEDS BY ELIG CLIN: HCPCS | Performed by: NURSE PRACTITIONER

## 2019-04-02 NOTE — PATIENT INSTRUCTIONS
PLAN:   1. Continue Tegretol at 800 mg twice daily. 2. Continue Zanaflex at  8 mg three times a day. 3. The use of Diastat 12.5 mg to be administered rectally for seizures lasting more than 3 minutes was discussed with the caregiver. 4. Blood work including CBC, AST, ALT, Lytes and Tegretol levels is also recommended every 4 months. 5. Seizure precautions were recommended to be maintained. The caregiver was instructed to notify our clinic if Sharan Gray has any breakthrough seizures for an earlier appointment. 6. Anticipatory guidance and preventative counseling was provided regarding seizure precautions; and first aid measures during seizures was discussed in detail.    7. I plan to see Sharan Gray back in 6 months or earlier if needed

## 2019-04-02 NOTE — PROGRESS NOTES
It was a pleasure to see Andres Bautista  in the Pediatric Neurology Clinic at [facility]. He is a 39 y.o. male accompanied by his caregiver Cali Morgan to this visit for a follow-up neurological evaluation. INTERIM PROGRESS:  EPILEPSY:   Cali Morgan has not witnessed any seizures since the last visit. Cali Morgan states that Michelle Magallon has not had any seizures witnessed since 1994. Michelle Magallon remains on Tegretol with no reported side effects. His last EEG in March 2018 was abnormal due to  generalized slowing as well as epileptiform discharges. The generalized slowing is suggestive of diffuse neuronal dysfunction. In addition, there were occasional sharp waves seen in the right frontal, central, and parietal regions. These waveforms are considered epileptiform in nature and suggest the presence of an epileptogenic focus as well as an increased risk of partial seizures in the future. Seizure description is unavailable however it is reported that they are grand mal in presentation. CEREBRAL PALSY:  Cali Morgan continues to persist with spasticity. Cali Morgan states that he is able to turn his body in the bed independently. He is wheel chair bound but able to stand on a tilt table. He continues to require assistance with feeding. He is non verbal and continues to need assistance with activities of daily living. Cali Morgan denies any recent regressions. He continues to take Zanaflex in this regard. Michelle medina continues to receive physical and occupational therapies. Previous Medications Tried: Baclofen (ineffective)     REVIEW OF SYSTEMS:  Constitutional: Negative. Eyes: Legally blind. Respiratory: Negative. Cardiovascular: Negative. Gastrointestinal: Negative. Genitourinary: Negative. Musculoskeletal: Positive for spastic Quadriparesis. Skin: Negative. Neurological: Negative for headaches, positive for seizures, positive for developmental delays. Hematological: Negative. Psychiatric/Behavioral: Negative for behavioral issues, Negative for ADHD     All other systems reviewed and are negative. Past, social, family, and developmental history was reviewed and unchanged. OBJECTIVE:   PHYSICAL EXAM:  BP (!) 153/93 (Site: Right Calf, Position: Sitting, Cuff Size: Medium Adult)   Pulse 101   Ht 5' 3.5\" (1.613 m)   Wt 140 lb 12.8 oz (63.9 kg)   BMI 24.55 kg/m²     Neurological: he is alert. he displays no atrophy, no tremor. No cranial nerve deficit. he displays no seizure activity. Jose Smalls sat in his wheelchair comfortably and was cooperative for the exam.   He made unintelligible sounds and shouted sometimes during the visit. Mild amount of spasticity was noted all over in the upper extremities with no restriction of range of motion. He was able to move his hands above his shoulder level. He was able to lift his lower extremities in the air, but there was significant spasticity noted in the hamstrings which made it difficult to extend the lower extremity. Exam was unchanged from the last visit. Reflex Scores: 3+ diffuse. No focal weakness noted on exam.     Nursing note and vitals reviewed. Constitutional: he appears well-developed and well-nourished. HENT: Mouth/Throat: Mucous membranes are moist.   Eyes: EOM are normal. Pupils are equal, round, and reactive to light. Neck: Normal range of motion. Neck supple. Cardiovascular: Regular rhythm, S1 normal and S2 normal.   Pulmonary/Chest: Effort normal and breath sounds normal.   Lymph Nodes: No significant lymphadenopathy noted. Musculoskeletal: Normal range of motion. Neurological: he is alert and rest of the exam is as mentioned above. Skin: Skin is warm and dry. No lesions or ulcers. RECORD REVIEW: Previous medical records were reviewed at today's visit. DIAGNOSTIC STUDIES:  11/15/2015 - EEG - This is an abnormal EEG due to generalized slowing as well as epileptiform discharges.   The generalized Gap Latest Ref Range: 9 - 17 mmol/L 13   GFR Non- Latest Ref Range: >60 mL/min >60   GFR African American Latest Ref Range: >60 mL/min >60   Glucose Latest Ref Range: 70 - 99 mg/dL 63 (L)   Calcium Latest Ref Range: 8.6 - 10.4 mg/dL 9.2   Albumin/Globulin Ratio Latest Ref Range: 1.0 - 2.5  1.4   Total Protein Latest Ref Range: 6.4 - 8.3 g/dL 7.3   GFR Comment Unknown Pend   GFR Staging Unknown NOT REPORTED   Albumin Latest Ref Range: 3.5 - 5.2 g/dL 4.2   Alk Phos Latest Ref Range: 40 - 129 U/L 121   ALT Latest Ref Range: 5 - 41 U/L 19   AST Latest Ref Range: <40 U/L 21   Bilirubin Latest Ref Range: 0.3 - 1.2 mg/dL 0.44   Carbamazepine Lvl Latest Ref Range: 4.0 - 12.0 ug/mL 11.6   WBC Latest Ref Range: 3.5 - 11.3 k/uL 4.6   RBC Latest Ref Range: 4.21 - 5.77 m/uL 5.11   Hemoglobin Quant Latest Ref Range: 13.0 - 17.0 g/dL 15.2   Hematocrit Latest Ref Range: 40.7 - 50.3 % 49.5   Platelet Count Latest Ref Range: 138 - 453 k/uL 247        Controlled Substances Monitoring:     RX Monitoring 4/2/2019   Attestation The Prescription Monitoring Report for this patient was reviewed today. Chronic Pain Routine Monitoring No signs of potential drug abuse or diversion identified: otherwise, see note documentation       ASSESSMENT:   Kat Guzman is a 36 y.o. male with:  1. Epilepsy with the last reported seizure occurring in May 1994.   2. Choreoathetoid cerebral palsy   3. Static Encephalopathy  4. Severe Spastic Quadriparesis  5. Spasticity, which continues to remain a concern. PLAN:   1. Continue Tegretol at 800 mg twice daily. 2. Continue Zanaflex at  8 mg three times a day. 3. The use of Diastat 12.5 mg to be administered rectally for seizures lasting more than 3 minutes was discussed with the caregiver. 4. Blood work including CBC, AST, ALT, Lytes and Tegretol levels is also recommended every 4 months. 5. Seizure precautions were recommended to be maintained.  The caregiver was instructed to notify our clinic if Efrem Verduzco has any breakthrough seizures for an earlier appointment. 6. Anticipatory guidance and preventative counseling was provided regarding seizure precautions; and first aid measures during seizures was discussed in detail.    7. I plan to see Efrem Verduzco back in 6 months or earlier if needed    Electronically signed by PITA Sanchez CNP on 4/3/2019 at 9:40 AM

## 2019-07-23 ENCOUNTER — HOSPITAL ENCOUNTER (OUTPATIENT)
Age: 44
Setting detail: SPECIMEN
Discharge: HOME | End: 2019-07-23
Payer: MEDICARE

## 2019-07-23 LAB
ALBUMIN SERPL-MCNC: 4.5 G/DL (ref 3.5–5.2)
ALBUMIN/GLOBULIN RATIO: 1.4 (ref 1–2.5)
ALP BLD-CCNC: 121 U/L (ref 40–129)
ALT SERPL-CCNC: 48 U/L (ref 5–41)
ANION GAP SERPL CALCULATED.3IONS-SCNC: 14 MMOL/L (ref 9–17)
AST SERPL-CCNC: 32 U/L
BILIRUB SERPL-MCNC: 0.32 MG/DL (ref 0.3–1.2)
BUN BLDV-MCNC: 19 MG/DL (ref 6–20)
BUN/CREAT BLD: ABNORMAL (ref 9–20)
CALCIUM SERPL-MCNC: 9.5 MG/DL (ref 8.6–10.4)
CARBAMAZEPINE DATE LAST DOSE: NORMAL
CARBAMAZEPINE DOSE AMOUNT: NORMAL
CARBAMAZEPINE DOSE TIME: NORMAL
CARBAMAZEPINE LEVEL: 12 UG/ML (ref 4–12)
CHLORIDE BLD-SCNC: 109 MMOL/L (ref 98–107)
CO2: 25 MMOL/L (ref 20–31)
CREAT SERPL-MCNC: 0.74 MG/DL (ref 0.7–1.2)
GFR AFRICAN AMERICAN: >60 ML/MIN
GFR NON-AFRICAN AMERICAN: >60 ML/MIN
GFR SERPL CREATININE-BSD FRML MDRD: ABNORMAL ML/MIN/{1.73_M2}
GFR SERPL CREATININE-BSD FRML MDRD: ABNORMAL ML/MIN/{1.73_M2}
GLUCOSE BLD-MCNC: 87 MG/DL (ref 70–99)
HCT VFR BLD CALC: 50.7 % (ref 40.7–50.3)
HEMOGLOBIN: 15.2 G/DL (ref 13–17)
MCH RBC QN AUTO: 30 PG (ref 25.2–33.5)
MCHC RBC AUTO-ENTMCNC: 30 G/DL (ref 28.4–34.8)
MCV RBC AUTO: 100 FL (ref 82.6–102.9)
NRBC AUTOMATED: 0 PER 100 WBC
PDW BLD-RTO: 13.5 % (ref 11.8–14.4)
PLATELET # BLD: 263 K/UL (ref 138–453)
PMV BLD AUTO: 11.4 FL (ref 8.1–13.5)
POTASSIUM SERPL-SCNC: 4 MMOL/L (ref 3.7–5.3)
RBC # BLD: 5.07 M/UL (ref 4.21–5.77)
SODIUM BLD-SCNC: 148 MMOL/L (ref 135–144)
TOTAL PROTEIN: 7.7 G/DL (ref 6.4–8.3)
WBC # BLD: 6.1 K/UL (ref 3.5–11.3)

## 2019-07-23 PROCEDURE — 36415 COLL VENOUS BLD VENIPUNCTURE: CPT

## 2019-07-23 PROCEDURE — 80156 ASSAY CARBAMAZEPINE TOTAL: CPT

## 2019-07-23 PROCEDURE — 80053 COMPREHEN METABOLIC PANEL: CPT

## 2019-07-23 PROCEDURE — 85027 COMPLETE CBC AUTOMATED: CPT

## 2019-07-23 PROCEDURE — P9603 ONE-WAY ALLOW PRORATED MILES: HCPCS

## 2019-08-30 ENCOUNTER — TELEPHONE (OUTPATIENT)
Dept: PEDIATRIC NEUROLOGY | Age: 44
End: 2019-08-30

## 2019-08-30 ENCOUNTER — HOSPITAL ENCOUNTER (OUTPATIENT)
Age: 44
Setting detail: SPECIMEN
Discharge: HOME | End: 2019-08-30
Payer: MEDICARE

## 2019-08-30 LAB
ALT SERPL-CCNC: 29 U/L (ref 5–41)
ANION GAP SERPL CALCULATED.3IONS-SCNC: 13 MMOL/L (ref 9–17)
AST SERPL-CCNC: 27 U/L
CARBAMAZEPINE DATE LAST DOSE: NORMAL
CARBAMAZEPINE DOSE AMOUNT: NORMAL
CARBAMAZEPINE DOSE TIME: NORMAL
CARBAMAZEPINE LEVEL: 11 UG/ML (ref 4–12)
CHLORIDE BLD-SCNC: 104 MMOL/L (ref 98–107)
CO2: 26 MMOL/L (ref 20–31)
HCT VFR BLD CALC: 49.9 % (ref 40.7–50.3)
HEMOGLOBIN: 15.2 G/DL (ref 13–17)
MCH RBC QN AUTO: 30.6 PG (ref 25.2–33.5)
MCHC RBC AUTO-ENTMCNC: 30.5 G/DL (ref 28.4–34.8)
MCV RBC AUTO: 100.6 FL (ref 82.6–102.9)
NRBC AUTOMATED: 0 PER 100 WBC
PDW BLD-RTO: 12.3 % (ref 11.8–14.4)
PLATELET # BLD: 218 K/UL (ref 138–453)
PMV BLD AUTO: 11.4 FL (ref 8.1–13.5)
POTASSIUM SERPL-SCNC: 4.9 MMOL/L (ref 3.7–5.3)
RBC # BLD: 4.96 M/UL (ref 4.21–5.77)
SODIUM BLD-SCNC: 143 MMOL/L (ref 135–144)
WBC # BLD: 6.1 K/UL (ref 3.5–11.3)

## 2019-08-30 PROCEDURE — 84460 ALANINE AMINO (ALT) (SGPT): CPT

## 2019-08-30 PROCEDURE — 80156 ASSAY CARBAMAZEPINE TOTAL: CPT

## 2019-08-30 PROCEDURE — 84450 TRANSFERASE (AST) (SGOT): CPT

## 2019-08-30 PROCEDURE — P9603 ONE-WAY ALLOW PRORATED MILES: HCPCS

## 2019-08-30 PROCEDURE — 36415 COLL VENOUS BLD VENIPUNCTURE: CPT

## 2019-08-30 PROCEDURE — 80051 ELECTROLYTE PANEL: CPT

## 2019-08-30 PROCEDURE — 85027 COMPLETE CBC AUTOMATED: CPT

## 2019-10-14 ENCOUNTER — OFFICE VISIT (OUTPATIENT)
Dept: PEDIATRIC NEUROLOGY | Age: 44
End: 2019-10-14
Payer: MEDICARE

## 2019-10-14 VITALS
RESPIRATION RATE: 16 BRPM | BODY MASS INDEX: 23.99 KG/M2 | SYSTOLIC BLOOD PRESSURE: 130 MMHG | DIASTOLIC BLOOD PRESSURE: 76 MMHG | WEIGHT: 140.5 LBS | HEIGHT: 64 IN

## 2019-10-14 DIAGNOSIS — G80.3 CHOREOATHETOID CEREBRAL PALSY (HCC): ICD-10-CM

## 2019-10-14 DIAGNOSIS — G82.50 SPASTIC QUADRIPLEGIA (HCC): ICD-10-CM

## 2019-10-14 DIAGNOSIS — G40.909 NONINTRACTABLE EPILEPSY WITHOUT STATUS EPILEPTICUS, UNSPECIFIED EPILEPSY TYPE (HCC): Primary | ICD-10-CM

## 2019-10-14 PROCEDURE — G8484 FLU IMMUNIZE NO ADMIN: HCPCS | Performed by: NURSE PRACTITIONER

## 2019-10-14 PROCEDURE — G8420 CALC BMI NORM PARAMETERS: HCPCS | Performed by: NURSE PRACTITIONER

## 2019-10-14 PROCEDURE — G8427 DOCREV CUR MEDS BY ELIG CLIN: HCPCS | Performed by: NURSE PRACTITIONER

## 2019-10-14 PROCEDURE — 1036F TOBACCO NON-USER: CPT | Performed by: NURSE PRACTITIONER

## 2019-10-14 PROCEDURE — 99214 OFFICE O/P EST MOD 30 MIN: CPT | Performed by: NURSE PRACTITIONER

## 2019-11-25 ENCOUNTER — OFFICE VISIT (OUTPATIENT)
Dept: PEDIATRIC NEUROLOGY | Age: 44
End: 2019-11-25

## 2019-11-25 DIAGNOSIS — G40.909 NONINTRACTABLE EPILEPSY WITHOUT STATUS EPILEPTICUS, UNSPECIFIED EPILEPSY TYPE (HCC): Primary | ICD-10-CM

## 2019-12-31 ENCOUNTER — HOSPITAL ENCOUNTER (OUTPATIENT)
Age: 44
Setting detail: SPECIMEN
Discharge: HOME | End: 2019-12-31
Payer: MEDICARE

## 2019-12-31 LAB
ALT SERPL-CCNC: 26 U/L (ref 5–41)
ANION GAP SERPL CALCULATED.3IONS-SCNC: 12 MMOL/L (ref 9–17)
AST SERPL-CCNC: 26 U/L
CARBAMAZEPINE DATE LAST DOSE: NORMAL
CARBAMAZEPINE DOSE AMOUNT: NORMAL
CARBAMAZEPINE DOSE TIME: NORMAL
CARBAMAZEPINE LEVEL: 11.3 UG/ML (ref 4–12)
CHLORIDE BLD-SCNC: 109 MMOL/L (ref 98–107)
CO2: 26 MMOL/L (ref 20–31)
HCT VFR BLD CALC: 48.4 % (ref 40.7–50.3)
HEMOGLOBIN: 14.7 G/DL (ref 13–17)
MCH RBC QN AUTO: 29.6 PG (ref 25.2–33.5)
MCHC RBC AUTO-ENTMCNC: 30.4 G/DL (ref 28.4–34.8)
MCV RBC AUTO: 97.6 FL (ref 82.6–102.9)
NRBC AUTOMATED: 0 PER 100 WBC
PDW BLD-RTO: 12.4 % (ref 11.8–14.4)
PLATELET # BLD: 225 K/UL (ref 138–453)
PMV BLD AUTO: 11.8 FL (ref 8.1–13.5)
POTASSIUM SERPL-SCNC: 4.1 MMOL/L (ref 3.7–5.3)
RBC # BLD: 4.96 M/UL (ref 4.21–5.77)
SODIUM BLD-SCNC: 147 MMOL/L (ref 135–144)
WBC # BLD: 5.3 K/UL (ref 3.5–11.3)

## 2019-12-31 PROCEDURE — 85027 COMPLETE CBC AUTOMATED: CPT

## 2019-12-31 PROCEDURE — 80156 ASSAY CARBAMAZEPINE TOTAL: CPT

## 2019-12-31 PROCEDURE — 84450 TRANSFERASE (AST) (SGOT): CPT

## 2019-12-31 PROCEDURE — 80051 ELECTROLYTE PANEL: CPT

## 2019-12-31 PROCEDURE — 84460 ALANINE AMINO (ALT) (SGPT): CPT

## 2019-12-31 PROCEDURE — 36415 COLL VENOUS BLD VENIPUNCTURE: CPT

## 2019-12-31 PROCEDURE — P9603 ONE-WAY ALLOW PRORATED MILES: HCPCS

## 2020-01-03 ENCOUNTER — TELEPHONE (OUTPATIENT)
Dept: PEDIATRIC NEUROLOGY | Age: 45
End: 2020-01-03

## 2020-01-03 NOTE — TELEPHONE ENCOUNTER
----- Message from PITA Saul CNP sent at 12/31/2019 11:40 AM EST -----  Labs within normal limits. Notify parents      Abhi Figueroa, nurse at [employer], notified of labs within normal limits and she verbalized understanding.

## 2020-05-18 ENCOUNTER — TELEPHONE (OUTPATIENT)
Dept: PEDIATRIC NEUROLOGY | Age: 45
End: 2020-05-18

## 2020-07-23 ENCOUNTER — HOSPITAL ENCOUNTER (OUTPATIENT)
Age: 45
Setting detail: SPECIMEN
Discharge: HOME | End: 2020-07-23
Payer: MEDICARE

## 2020-07-23 LAB
CARBAMAZEPINE DATE LAST DOSE: NORMAL
CARBAMAZEPINE DOSE AMOUNT: NORMAL
CARBAMAZEPINE DOSE TIME: NORMAL
CARBAMAZEPINE LEVEL: 10.9 UG/ML (ref 4–12)

## 2020-07-23 PROCEDURE — 36415 COLL VENOUS BLD VENIPUNCTURE: CPT

## 2020-07-23 PROCEDURE — 80156 ASSAY CARBAMAZEPINE TOTAL: CPT

## 2020-07-23 PROCEDURE — P9603 ONE-WAY ALLOW PRORATED MILES: HCPCS

## 2020-07-29 ENCOUNTER — OFFICE VISIT (OUTPATIENT)
Dept: PEDIATRIC NEUROLOGY | Age: 45
End: 2020-07-29
Payer: MEDICARE

## 2020-07-29 PROCEDURE — G8427 DOCREV CUR MEDS BY ELIG CLIN: HCPCS | Performed by: NURSE PRACTITIONER

## 2020-07-29 PROCEDURE — 99214 OFFICE O/P EST MOD 30 MIN: CPT | Performed by: NURSE PRACTITIONER

## 2020-07-29 NOTE — PATIENT INSTRUCTIONS
PLAN:   1. I would again  recommend an EEG to evaluate for epileptiform activity. 2. Continue Tegretol at 800 mg twice daily. 3. Continue Zanaflex at  8 mg three times a day. 4. The use of Diastat 12.5 mg to be administered rectally for seizures lasting more than 3 minutes was discussed with the caregiver. 5. Blood work including CBC, AST, ALT, Lytes and Tegretol levels is also recommended every 4 months. 6. Seizure precautions were recommended to be maintained. The caregiver was instructed to notify our clinic if Paul Hughes has any breakthrough seizures for an earlier appointment. 7. Anticipatory guidance and preventative counseling was provided regarding seizure precautions; and first aid measures during seizures was discussed in detail.    8. I plan to see Paul Hughes back in 6 months or earlier if needed

## 2020-07-29 NOTE — LETTER
[address] [facility]   [address]  [facility], [address]  Phone: [phone]  [phone]      2020      Joline Landau, MD  [address] [address]    Patient: Matty Robledo  YOB: 1975  Date of Visit: 2020   MRN:  [id number]      Dear Dr. Glory Rashid,      2020    TELEHEALTH EVALUATION -- Audio/Visual (During KMFUV-36 public health emergency)    Patient and Nurse Practitioner are located in their individual homes    HPI:    Matty Robledo (:  1975)caregiver Shonda Parks has requested an audio/video evaluation for the following concern(s):    EPILEPSY:   Caregiver Shonda Parks states that Lacie Raymundo has not had any seizures since the last visit. Lacie Raymundo has not had any witnessed seizures since . His last EEG was in March in 2018 and was abnormal due to  generalized slowing as well as epileptiform discharges. The generalized slowing is suggestive of diffuse neuronal dysfunction. In addition, there were occasional sharp waves seen in the right frontal, central, and parietal regions. These waveforms are considered epileptiform in nature and suggest the presence of an epileptogenic focus as well as an increased risk of partial seizures in the future. His seizure description was reported to be seizures that were grand mal in presentation. He remains on Tegretol with no reported side effects or concerns. CEREBRAL PALSY:  Shonda Parks states that Lacie Raymundo continues to persist with spasticity. Shonda Parks states that the spasticity continues and is unchanged from the last visit. Lacie Raymundo has spasticity in his upper and lower extremities, but worse in the lower extremities at the knee. Lacie Raymundo is wheelchair bound. He is unable to stand. Lacie Raymundo requires assistance with activities of daily living and feeding. There have been no concerns for regressions. Lacie Raymundo remains on Zanaflex with no reported side effects.   He continues to be involved in physical and occupational therapies. No new concerns per Sharri Reece. Previous Medications Tried: Baclofen (ineffective)     REVIEW OF SYSTEMS:  Constitutional: Negative. Eyes: Legally blind. Respiratory: Negative. Cardiovascular: Negative. Gastrointestinal: Negative. Genitourinary: Negative. Musculoskeletal: Positive for spastic Quadriparesis. Skin: Negative. Neurological: Negative for headaches, positive for seizures, positive for developmental delays. Hematological: Negative. Psychiatric/Behavioral: Negative for behavioral issues, Negative for ADHD     All other systems reviewed and are negative. Past, social, family, and developmental history was reviewed and unchanged. PHYSICAL EXAMINATION:  Wheelchair bound. Mild spasticity in upper extremities but no restriction in range of motion. Moderate Spasticity at the hamstrings with restriction in extension. Nonverbal  Constitutional: [x] Appears well-developed and well-nourished. [] Abnormal  Mental status  [x] Alert and awake  [] Oriented to person/place/time []Able to follow commands    [x] No apparent distress      Eyes:  EOM    [x]  Normal  [] Abnormal-  Sclera  [x]  Normal  [] Abnormal -         Discharge [x]  None visible  [] Abnormal -    HENT:   [x] Normocephalic, atraumatic. [] Abnormal shaped head   [x] Mouth/Throat: Mucous membranes are moist. No facial asymmetry. Ears [x] Normal external  inspection of the ears and nose. No lesion, scars or masses. Normal hearing. [] Abnormal-    Neck: [x] Normal range of motion [x] Supple [x] No visualized mass. Pulmonary/Chest: [x] Respiratory effort normal.  [x] No visualized signs of difficulty breathing or respiratory distress        [] Abnormal      Musculoskeletal:   [] Normal range of motion. [] Normal gait with no signs of ataxia. [x]  No signs of cyanosis of the peripheral portions of extremities.          [] Abnormal Neurological:        [x] Normal cranial nerve (limited exam to video visit) [x] No focal weakness        [] Abnormal          Speech       [x] Normal   [] Abnormal     Skin:        [x] No rash on visible skin  [x] Normal  [] Abnormal     Psychiatric:       [x] Normal  [] Abnormal        [x] Normal Mood  [] Anxious appearing        RECORD REVIEW: Previous medical records were reviewed at today's visit. DIAGNOSTIC STUDIES:  11/15/2015 - EEG - This is an abnormal EEG due to generalized slowing as well as epileptiform discharges. The generalized slowing is suggestive of diffuse neuronal dysfunction. In addition, there  were focal discharges seen during the study as mentioned above. These waveforms are considered to be epileptiform in nature. This constellation of findings is consistent with a partial epileptogenic  abnormality and an increased risk of seizures in the future. 12/10/2015 - MRI Brain - Marked white matter volume loss throughout both cerebral hemispheres with marked thinning of the corpus callosum and enlarged lateral ventricles with undulating borders and mild increased signal intensity on FLAIR sequence in the periventricular white matter. 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 Head of the mandibular condyles are markedly enlarged bilaterally. No associated edema or joint effusions evident. Visualized muscles of mastication do not appear enlarged. This is of indeterminate etiology. Correlate for any symptoms referrable to this area. Small amount of fluid right mastoid air cells. Correlate for signs or symptoms of infection. 03/01/2018 - EEG - Abnormal due to generalized slowing as well as epileptiform discharges. The generalized slowing is suggestive of diffuse neuronal dysfunction. In addition, there were occasional sharp waves seen in the right frontal, central, and parietal regions.   These waveforms are considered epileptiform in nature and suggest the presence of an epileptogenic focus as well as an increased risk of partial seizures in the future. Results for Aliyah Sánchez (MRN [id number]) as of 7/29/2020 09:31   Ref. Range 12/31/2019 05:00   Sodium Latest Ref Range: 135 - 144 mmol/L 147 (H)   Potassium Latest Ref Range: 3.7 - 5.3 mmol/L 4.1   Chloride Latest Ref Range: 98 - 107 mmol/L 109 (H)   CO2 Latest Ref Range: 20 - 31 mmol/L 26   Anion Gap Latest Ref Range: 9 - 17 mmol/L 12   ALT Latest Ref Range: 5 - 41 U/L 26   AST Latest Ref Range: <40 U/L 26   Carbamazepine Lvl Latest Ref Range: 4.0 - 12.0 ug/mL 11.3   WBC Latest Ref Range: 3.5 - 11.3 k/uL 5.3   RBC Latest Ref Range: 4.21 - 5.77 m/uL 4.96   Hemoglobin Quant Latest Ref Range: 13.0 - 17.0 g/dL 14.7   Hematocrit Latest Ref Range: 40.7 - 50.3 % 48.4   Platelet Count Latest Ref Range: 138 - 453 k/uL 225   NRBC Automated Latest Ref Range: 0.0 per 100 WBC 0.0     ASSESSMENT:   Ilana Romero is a 36 y.o. male with:  1. Epilepsy with the last reported seizure occurring in May 1994.   2. Choreoathetoid cerebral palsy   3. Static Encephalopathy  4. Severe Spastic Quadriparesis  5. Spasticity, which continues to persist.  Nelli Noemilana states that it is manageable on his current dose of Zanaflex. PLAN:   1. I would again  recommend an EEG to evaluate for epileptiform activity. 2. Continue Tegretol at 800 mg twice daily. 3. Continue Zanaflex at  8 mg three times a day. 4. The use of Diastat 12.5 mg to be administered rectally for seizures lasting more than 3 minutes was discussed with the caregiver. 5. Blood work including CBC, AST, ALT, Lytes and Tegretol levels is also recommended every 4 months. 6. Seizure precautions were recommended to be maintained. The caregiver was instructed to notify our clinic if Loli Fletcher has any breakthrough seizures for an earlier appointment.     7. Anticipatory guidance and preventative counseling was provided regarding seizure precautions; and first aid measures during seizures was discussed in detail. 8. I plan to see Kristina Arshad back in 6 months or earlier if needed    Electronically signed by PITA Castillo CNP on 7/29/2020 at 9:20 AM                        If you have any questions or concerns, please feel free to call me. Thank you again for referring this patient to be seen in our clinic.     Sincerely,        Luciana Matos CNP

## 2020-07-29 NOTE — PROGRESS NOTES
2020    TELEHEALTH EVALUATION -- Audio/Visual (During YUMPS-29 public health emergency)    Patient and Nurse Practitioner are located in their individual homes    HPI:    Cade Gardiner (:  1975)caregiver Paco Figueroa has requested an audio/video evaluation for the following concern(s):    EPILEPSY:   Caregiver Paco Figueroa states that Ibeth Loredo has not had any seizures since the last visit. Ibeth Loredo has not had any witnessed seizures since . His last EEG was in March in 2018 and was abnormal due to  generalized slowing as well as epileptiform discharges. The generalized slowing is suggestive of diffuse neuronal dysfunction. In addition, there were occasional sharp waves seen in the right frontal, central, and parietal regions. These waveforms are considered epileptiform in nature and suggest the presence of an epileptogenic focus as well as an increased risk of partial seizures in the future. His seizure description was reported to be seizures that were grand mal in presentation. He remains on Tegretol with no reported side effects or concerns. CEREBRAL PALSY:  Paco Figueroa states that Ibeth Loredo continues to persist with spasticity. Paco Figueroa states that the spasticity continues and is unchanged from the last visit. Ibeth Loredo has spasticity in his upper and lower extremities, but worse in the lower extremities at the knee. Ibeth Loredo is wheelchair bound. He is unable to stand. Ibeth Loredo requires assistance with activities of daily living and feeding. There have been no concerns for regressions. Ibeth Loredo remains on Zanaflex with no reported side effects. He continues to be involved in physical and occupational therapies. No new concerns per Paco Figueroa. Previous Medications Tried: Baclofen (ineffective)     REVIEW OF SYSTEMS:  Constitutional: Negative. Eyes: Legally blind. Respiratory: Negative. Cardiovascular: Negative. Gastrointestinal: Negative. Genitourinary: Negative.    Musculoskeletal: Positive for spastic Quadriparesis. Skin: Negative. Neurological: Negative for headaches, positive for seizures, positive for developmental delays. Hematological: Negative. Psychiatric/Behavioral: Negative for behavioral issues, Negative for ADHD     All other systems reviewed and are negative. Past, social, family, and developmental history was reviewed and unchanged. PHYSICAL EXAMINATION:  Wheelchair bound. Mild spasticity in upper extremities but no restriction in range of motion. Moderate Spasticity at the hamstrings with restriction in extension. Nonverbal  Constitutional: [x] Appears well-developed and well-nourished. [] Abnormal  Mental status  [x] Alert and awake  [] Oriented to person/place/time []Able to follow commands    [x] No apparent distress      Eyes:  EOM    [x]  Normal  [] Abnormal-  Sclera  [x]  Normal  [] Abnormal -         Discharge [x]  None visible  [] Abnormal -    HENT:   [x] Normocephalic, atraumatic. [] Abnormal shaped head   [x] Mouth/Throat: Mucous membranes are moist. No facial asymmetry. Ears [x] Normal external  inspection of the ears and nose. No lesion, scars or masses. Normal hearing. [] Abnormal-    Neck: [x] Normal range of motion [x] Supple [x] No visualized mass. Pulmonary/Chest: [x] Respiratory effort normal.  [x] No visualized signs of difficulty breathing or respiratory distress        [] Abnormal      Musculoskeletal:   [] Normal range of motion. [] Normal gait with no signs of ataxia. [x]  No signs of cyanosis of the peripheral portions of extremities.          [] Abnormal       Neurological:        [x] Normal cranial nerve (limited exam to video visit) [x] No focal weakness        [] Abnormal          Speech       [x] Normal   [] Abnormal     Skin:        [x] No rash on visible skin  [x] Normal  [] Abnormal     Psychiatric:       [x] Normal  [] Abnormal        [x] Normal Mood  [] Anxious appearing        RECORD REVIEW: Previous medical records were reviewed at today's visit. DIAGNOSTIC STUDIES:  11/15/2015 - EEG - This is an abnormal EEG due to generalized slowing as well as epileptiform discharges. The generalized slowing is suggestive of diffuse neuronal dysfunction. In addition, there  were focal discharges seen during the study as mentioned above. These waveforms are considered to be epileptiform in nature. This constellation of findings is consistent with a partial epileptogenic  abnormality and an increased risk of seizures in the future. 12/10/2015 - MRI Brain - Marked white matter volume loss throughout both cerebral hemispheres with marked thinning of the corpus callosum and enlarged lateral ventricles with undulating borders and mild increased signal intensity on FLAIR sequence in the periventricular white matter. 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 Head of the mandibular condyles are markedly enlarged bilaterally. No associated edema or joint effusions evident. Visualized muscles of mastication do not appear enlarged. This is of indeterminate etiology. Correlate for any symptoms referrable to this area. Small amount of fluid right mastoid air cells. Correlate for signs or symptoms of infection. 03/01/2018 - EEG - Abnormal due to generalized slowing as well as epileptiform discharges. The generalized slowing is suggestive of diffuse neuronal dysfunction. In addition, there were occasional sharp waves seen in the right frontal, central, and parietal regions. These waveforms are considered epileptiform in nature and suggest the presence of an epileptogenic focus as well as an increased risk of partial seizures in the future. Results for Med Staton (MRN [id number]) as of 7/29/2020 09:31   Ref.  Range 12/31/2019 05:00   Sodium Latest Ref Range: 135 - 144 mmol/L 147 (H)   Potassium Latest Ref Range: 3.7 - 5.3 mmol/L 4.1   Chloride Latest Ref Range: 98 - 107 mmol/L 109 (H)   CO2 Latest Ref Range: 20 -

## 2020-08-25 ENCOUNTER — HOSPITAL ENCOUNTER (OUTPATIENT)
Age: 45
Setting detail: SPECIMEN
Discharge: HOME | End: 2020-08-25
Payer: MEDICARE

## 2020-08-25 LAB
ALT SERPL-CCNC: 20 U/L (ref 5–41)
ANION GAP SERPL CALCULATED.3IONS-SCNC: 13 MMOL/L (ref 9–17)
AST SERPL-CCNC: 20 U/L
CARBAMAZEPINE DATE LAST DOSE: NORMAL
CARBAMAZEPINE DOSE AMOUNT: NORMAL
CARBAMAZEPINE DOSE TIME: NORMAL
CARBAMAZEPINE LEVEL: 10.2 UG/ML (ref 4–12)
CHLORIDE BLD-SCNC: 108 MMOL/L (ref 98–107)
CO2: 26 MMOL/L (ref 20–31)
HCT VFR BLD CALC: 48.6 % (ref 40.7–50.3)
HEMOGLOBIN: 14.5 G/DL (ref 13–17)
MCH RBC QN AUTO: 29.2 PG (ref 25.2–33.5)
MCHC RBC AUTO-ENTMCNC: 29.8 G/DL (ref 28.4–34.8)
MCV RBC AUTO: 97.8 FL (ref 82.6–102.9)
NRBC AUTOMATED: 0 PER 100 WBC
PDW BLD-RTO: 12.6 % (ref 11.8–14.4)
PLATELET # BLD: 245 K/UL (ref 138–453)
PMV BLD AUTO: 10.6 FL (ref 8.1–13.5)
POTASSIUM SERPL-SCNC: 4.2 MMOL/L (ref 3.7–5.3)
RBC # BLD: 4.97 M/UL (ref 4.21–5.77)
SODIUM BLD-SCNC: 147 MMOL/L (ref 135–144)
WBC # BLD: 4.4 K/UL (ref 3.5–11.3)

## 2020-08-25 PROCEDURE — 36415 COLL VENOUS BLD VENIPUNCTURE: CPT

## 2020-08-25 PROCEDURE — 84460 ALANINE AMINO (ALT) (SGPT): CPT

## 2020-08-25 PROCEDURE — 84450 TRANSFERASE (AST) (SGOT): CPT

## 2020-08-25 PROCEDURE — 80156 ASSAY CARBAMAZEPINE TOTAL: CPT

## 2020-08-25 PROCEDURE — P9603 ONE-WAY ALLOW PRORATED MILES: HCPCS

## 2020-08-25 PROCEDURE — 80051 ELECTROLYTE PANEL: CPT

## 2020-08-25 PROCEDURE — 85027 COMPLETE CBC AUTOMATED: CPT

## 2020-08-26 ENCOUNTER — TELEPHONE (OUTPATIENT)
Dept: PEDIATRIC NEUROLOGY | Age: 45
End: 2020-08-26

## 2020-08-26 NOTE — TELEPHONE ENCOUNTER
----- Message from PITA Amor CNP sent at 8/26/2020  1:56 PM EDT -----  Labs within normal limits.   Notify parents

## 2020-11-12 ENCOUNTER — HOSPITAL ENCOUNTER (OUTPATIENT)
Age: 45
Setting detail: SPECIMEN
Discharge: HOME | End: 2020-11-12
Payer: MEDICARE

## 2020-11-12 LAB
ALBUMIN SERPL-MCNC: 4.2 G/DL (ref 3.5–5.2)
ALBUMIN/GLOBULIN RATIO: 1.5 (ref 1–2.5)
ALP BLD-CCNC: 142 U/L (ref 40–129)
ALT SERPL-CCNC: 20 U/L (ref 5–41)
AST SERPL-CCNC: 19 U/L
BILIRUB SERPL-MCNC: 0.2 MG/DL (ref 0.3–1.2)
BILIRUBIN DIRECT: <0.08 MG/DL
BILIRUBIN, INDIRECT: ABNORMAL MG/DL (ref 0–1)
GLOBULIN: ABNORMAL G/DL (ref 1.5–3.8)
TOTAL PROTEIN: 7 G/DL (ref 6.4–8.3)

## 2020-11-12 PROCEDURE — P9603 ONE-WAY ALLOW PRORATED MILES: HCPCS

## 2020-11-12 PROCEDURE — 80076 HEPATIC FUNCTION PANEL: CPT

## 2020-11-12 PROCEDURE — 36415 COLL VENOUS BLD VENIPUNCTURE: CPT

## 2020-12-14 ENCOUNTER — APPOINTMENT (OUTPATIENT)
Dept: CT IMAGING | Age: 45
End: 2020-12-14
Payer: MEDICARE

## 2020-12-14 ENCOUNTER — HOSPITAL ENCOUNTER (EMERGENCY)
Age: 45
Discharge: HOME | End: 2020-12-14
Attending: EMERGENCY MEDICINE
Payer: MEDICARE

## 2020-12-14 VITALS
WEIGHT: 118 LBS | HEART RATE: 95 BPM | TEMPERATURE: 98.5 F | BODY MASS INDEX: 20.91 KG/M2 | RESPIRATION RATE: 15 BRPM | OXYGEN SATURATION: 97 % | DIASTOLIC BLOOD PRESSURE: 78 MMHG | SYSTOLIC BLOOD PRESSURE: 109 MMHG | HEIGHT: 63 IN

## 2020-12-14 LAB
-: ABNORMAL
ABSOLUTE BANDS #: 0.44 K/UL (ref 0–1)
ABSOLUTE EOS #: 0 K/UL (ref 0–0.4)
ABSOLUTE IMMATURE GRANULOCYTE: ABNORMAL K/UL (ref 0–0.3)
ABSOLUTE LYMPH #: 1.04 K/UL (ref 1–4.8)
ABSOLUTE MONO #: 0.59 K/UL (ref 0.1–1.3)
ALBUMIN SERPL-MCNC: 4 G/DL (ref 3.5–5.2)
ALBUMIN/GLOBULIN RATIO: ABNORMAL (ref 1–2.5)
ALP BLD-CCNC: 114 U/L (ref 40–129)
ALT SERPL-CCNC: 29 U/L (ref 5–41)
AMORPHOUS: ABNORMAL
ANION GAP SERPL CALCULATED.3IONS-SCNC: 12 MMOL/L (ref 9–17)
AST SERPL-CCNC: 25 U/L
BACTERIA: ABNORMAL
BANDS: 3 % (ref 0–10)
BASOPHILS # BLD: 0 % (ref 0–2)
BASOPHILS ABSOLUTE: 0 K/UL (ref 0–0.2)
BILIRUB SERPL-MCNC: 0.19 MG/DL (ref 0.3–1.2)
BILIRUBIN URINE: NEGATIVE
BUN BLDV-MCNC: 15 MG/DL (ref 6–20)
BUN/CREAT BLD: ABNORMAL (ref 9–20)
CALCIUM SERPL-MCNC: 9.4 MG/DL (ref 8.6–10.4)
CASTS UA: ABNORMAL /LPF
CHLORIDE BLD-SCNC: 112 MMOL/L (ref 98–107)
CO2: 26 MMOL/L (ref 20–31)
COLOR: YELLOW
COMMENT UA: ABNORMAL
CREAT SERPL-MCNC: 0.68 MG/DL (ref 0.7–1.2)
CRYSTALS, UA: ABNORMAL /HPF
DIFFERENTIAL TYPE: ABNORMAL
EOSINOPHILS RELATIVE PERCENT: 0 % (ref 0–4)
EPITHELIAL CELLS UA: ABNORMAL /HPF
GFR AFRICAN AMERICAN: >60 ML/MIN
GFR NON-AFRICAN AMERICAN: >60 ML/MIN
GFR SERPL CREATININE-BSD FRML MDRD: ABNORMAL ML/MIN/{1.73_M2}
GFR SERPL CREATININE-BSD FRML MDRD: ABNORMAL ML/MIN/{1.73_M2}
GLUCOSE BLD-MCNC: 93 MG/DL (ref 70–99)
GLUCOSE URINE: NEGATIVE
HCT VFR BLD CALC: 44.6 % (ref 41–53)
HEMOGLOBIN: 14 G/DL (ref 13.5–17.5)
IMMATURE GRANULOCYTES: ABNORMAL %
KETONES, URINE: NEGATIVE
LEUKOCYTE ESTERASE, URINE: ABNORMAL
LIPASE: 46 U/L (ref 13–60)
LYMPHOCYTES # BLD: 7 % (ref 24–44)
MCH RBC QN AUTO: 29 PG (ref 26–34)
MCHC RBC AUTO-ENTMCNC: 31.4 G/DL (ref 31–37)
MCV RBC AUTO: 92.2 FL (ref 80–100)
MONOCYTES # BLD: 4 % (ref 1–7)
MORPHOLOGY: NORMAL
MUCUS: ABNORMAL
NITRITE, URINE: NEGATIVE
NRBC AUTOMATED: ABNORMAL PER 100 WBC
OTHER OBSERVATIONS UA: ABNORMAL
PDW BLD-RTO: 13.7 % (ref 11.5–14.9)
PH UA: 8.5 (ref 5–8)
PLATELET # BLD: 381 K/UL (ref 150–450)
PLATELET ESTIMATE: ABNORMAL
PMV BLD AUTO: 7.6 FL (ref 6–12)
POTASSIUM SERPL-SCNC: 4.7 MMOL/L (ref 3.7–5.3)
PROTEIN UA: NEGATIVE
RBC # BLD: 4.84 M/UL (ref 4.5–5.9)
RBC # BLD: ABNORMAL 10*6/UL
RBC UA: ABNORMAL /HPF
RENAL EPITHELIAL, UA: ABNORMAL /HPF
SEG NEUTROPHILS: 86 % (ref 36–66)
SEGMENTED NEUTROPHILS ABSOLUTE COUNT: 12.73 K/UL (ref 1.3–9.1)
SODIUM BLD-SCNC: 150 MMOL/L (ref 135–144)
SPECIFIC GRAVITY UA: 1.04 (ref 1–1.03)
TOTAL PROTEIN: 7.7 G/DL (ref 6.4–8.3)
TRICHOMONAS: ABNORMAL
TURBIDITY: CLEAR
URINE HGB: NEGATIVE
UROBILINOGEN, URINE: NORMAL
WBC # BLD: 14.8 K/UL (ref 3.5–11)
WBC # BLD: ABNORMAL 10*3/UL
WBC UA: ABNORMAL /HPF
YEAST: ABNORMAL

## 2020-12-14 PROCEDURE — 36415 COLL VENOUS BLD VENIPUNCTURE: CPT

## 2020-12-14 PROCEDURE — 81001 URINALYSIS AUTO W/SCOPE: CPT

## 2020-12-14 PROCEDURE — 74177 CT ABD & PELVIS W/CONTRAST: CPT

## 2020-12-14 PROCEDURE — 2580000003 HC RX 258: Performed by: EMERGENCY MEDICINE

## 2020-12-14 PROCEDURE — 6370000000 HC RX 637 (ALT 250 FOR IP): Performed by: EMERGENCY MEDICINE

## 2020-12-14 PROCEDURE — 87088 URINE BACTERIA CULTURE: CPT

## 2020-12-14 PROCEDURE — 85025 COMPLETE CBC W/AUTO DIFF WBC: CPT

## 2020-12-14 PROCEDURE — 6360000004 HC RX CONTRAST MEDICATION: Performed by: EMERGENCY MEDICINE

## 2020-12-14 PROCEDURE — 87086 URINE CULTURE/COLONY COUNT: CPT

## 2020-12-14 PROCEDURE — 87186 SC STD MICRODIL/AGAR DIL: CPT

## 2020-12-14 PROCEDURE — 83690 ASSAY OF LIPASE: CPT

## 2020-12-14 PROCEDURE — 99283 EMERGENCY DEPT VISIT LOW MDM: CPT

## 2020-12-14 PROCEDURE — 80053 COMPREHEN METABOLIC PANEL: CPT

## 2020-12-14 RX ORDER — CEPHALEXIN 250 MG/1
500 CAPSULE ORAL ONCE
Status: COMPLETED | OUTPATIENT
Start: 2020-12-14 | End: 2020-12-14

## 2020-12-14 RX ORDER — SODIUM CHLORIDE 0.9 % (FLUSH) 0.9 %
10 SYRINGE (ML) INJECTION PRN
Status: DISCONTINUED | OUTPATIENT
Start: 2020-12-14 | End: 2020-12-14 | Stop reason: HOSPADM

## 2020-12-14 RX ORDER — 0.9 % SODIUM CHLORIDE 0.9 %
80 INTRAVENOUS SOLUTION INTRAVENOUS ONCE
Status: COMPLETED | OUTPATIENT
Start: 2020-12-14 | End: 2020-12-14

## 2020-12-14 RX ORDER — CEPHALEXIN 500 MG/1
500 CAPSULE ORAL 2 TIMES DAILY
Qty: 14 CAPSULE | Refills: 0 | Status: SHIPPED | OUTPATIENT
Start: 2020-12-14 | End: 2020-12-21

## 2020-12-14 RX ORDER — 0.9 % SODIUM CHLORIDE 0.9 %
1000 INTRAVENOUS SOLUTION INTRAVENOUS ONCE
Status: COMPLETED | OUTPATIENT
Start: 2020-12-14 | End: 2020-12-14

## 2020-12-14 RX ADMIN — SODIUM CHLORIDE 80 ML: 9 INJECTION, SOLUTION INTRAVENOUS at 16:43

## 2020-12-14 RX ADMIN — IOPAMIDOL 75 ML: 755 INJECTION, SOLUTION INTRAVENOUS at 16:43

## 2020-12-14 RX ADMIN — SODIUM CHLORIDE 1000 ML: 9 INJECTION, SOLUTION INTRAVENOUS at 16:00

## 2020-12-14 RX ADMIN — Medication 10 ML: at 16:44

## 2020-12-14 RX ADMIN — CEPHALEXIN 500 MG: 250 CAPSULE ORAL at 18:50

## 2020-12-14 ASSESSMENT — ENCOUNTER SYMPTOMS: ABDOMINAL PAIN: 1

## 2020-12-14 ASSESSMENT — PAIN SCALES - PAIN ASSESSMENT IN ADVANCED DEMENTIA (PAINAD)
BODYLANGUAGE: 0
BREATHING: 0
TOTALSCORE: 0
FACIALEXPRESSION: 0
NEGVOCALIZATION: 0
CONSOLABILITY: 0

## 2020-12-14 ASSESSMENT — PAIN SCALES - GENERAL: PAINLEVEL_OUTOF10: 0

## 2020-12-14 NOTE — ED PROVIDER NOTES
EMERGENCY DEPARTMENT ENCOUNTER    Pt Name: Suzette Storey  MRN: [id number]  [place] 1975  Date of evaluation: 12/14/20  CHIEF COMPLAINT       Chief Complaint   Patient presents with    Abdominal Pain    Urinary Tract Infection     HISTORY OF PRESENT ILLNESS     Abdominal Pain  Pain location:  Generalized  Pain quality: aching    Pain radiates to:  Does not radiate  Pain severity:  Moderate  Onset quality:  Gradual  Duration:  1 week  Timing:  Constant  Progression:  Unchanged  Chronicity:  New  Relieved by:  Nothing  Worsened by:  Nothing  Ineffective treatments:  None tried    Had some fevers this week  Lives at [facility]  He is wheelchair bound  History from [address] by homecare today, he was biting his hand when they pushed on his abdomen    REVIEW OF SYSTEMS     Review of Systems   Unable to perform ROS: Patient nonverbal   Gastrointestinal: Positive for abdominal pain. All other systems reviewed and are negative.     PASTMEDICAL HISTORY     Past Medical History:   Diagnosis Date    Anemia     Cerebral palsy ([address]     Cortical blindness     Development delay     Encephalopathy     [facility] [facility]     LAST SEIZURE 1994    GERD (gastroesophageal reflux disease)     Nursing home resident     [facility]    Profound intellectual disabilities     Spastic quadriplegia ([address]     Wheelchair dependence     NON AMBULATORY     Past Problem List  Patient Active Problem List   Diagnosis Code    Epilepsy ([address] G40.909    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    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    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    Spasticity R25.2     SURGICAL HISTORY       Past Surgical History:   Procedure Laterality Date    OTHER SURGICAL HISTORY  12/10/2015    MRI brain     CURRENT MEDICATIONS       Previous Medications    BISACODYL (DULCOLAX) 5 MG EC TABLET    Take 10 mg by mouth nightly     CARBAMAZEPINE (TEGRETOL) 100 MG CHEWABLE TABLET    Take 800 mg by mouth 2 times daily    CARBAMIDE PEROXIDE (DEBROX OT)    Place 2 drops in ear(s) 2 times daily X3 DAYS STANDING ORDERS    LORATADINE (CLARITIN) 10 MG TABLET    Take 10 mg by mouth daily    MAGNESIUM HYDROXIDE (MILK OF MAGNESIA) 400 MG/5ML SUSPENSION    Take 15 mLs by mouth 2 times daily     METOCLOPRAMIDE (REGLAN) 5 MG TABLET    Take 5 mg by mouth 3 times daily    OMEPRAZOLE (PRILOSEC) 20 MG CAPSULE    Take 40 mg by mouth daily    POLYETHYLENE GLYCOL (GLYCOLAX) POWDER    Take 17 g by mouth daily    SUCRALFATE (CARAFATE) 1 GM/10ML SUSPENSION    Take 1 g by mouth 4 times daily    TIZANIDINE (ZANAFLEX) 4 MG TABLET    Take 4 mg by mouth nightly     ALLERGIES     has No Known Allergies. FAMILY HISTORY     has no family status information on file. SOCIAL HISTORY       Social History     Tobacco Use    Smoking status: Never Smoker    Smokeless tobacco: Never Used   Substance Use Topics    Alcohol use: No    Drug use: No     PHYSICAL EXAM     INITIAL VITALS: /78   Pulse 95   Temp 98.5 °F (36.9 °C) (Temporal)   Resp 15   Ht 5' 3\" (1.6 m)   Wt 118 lb (53.5 kg)   SpO2 97%   BMI 20.90 kg/m²    Physical Exam  Constitutional:       General: He is not in acute distress. Appearance: Normal appearance. He is well-developed. He is not diaphoretic. HENT:      Head: Normocephalic and atraumatic. Right Ear: External ear normal.      Left Ear: External ear normal.      Nose: Nose normal. No congestion. Mouth/Throat:      Mouth: Mucous membranes are moist.      Pharynx: Oropharynx is clear. Eyes:      General:         Right eye: No discharge. Left eye: No discharge. Conjunctiva/sclera: Conjunctivae normal.      Pupils: Pupils are equal, round, and reactive to light. Neck:      Musculoskeletal: Normal range of motion and neck supple. Trachea: No tracheal deviation. Cardiovascular:      Rate and Rhythm: Normal rate and regular rhythm. Pulses: Normal pulses.       Heart sounds: Normal heart sounds. Pulmonary:      Effort: Pulmonary effort is normal. No respiratory distress. Breath sounds: Normal breath sounds. No stridor. No wheezing or rales. Abdominal:      Palpations: Abdomen is soft. Tenderness: There is no guarding or rebound. Comments: Some mild abd tenderness   Genitourinary:     Penis: Normal.       Testes: Normal.      Comments: In diaper  Musculoskeletal: Normal range of motion. General: No tenderness or deformity. Comments: Bilateral arm and leg contractions   Skin:     General: Skin is warm and dry. Capillary Refill: Capillary refill takes less than 2 seconds. Findings: No erythema or rash. Neurological:      Mental Status: He is alert. Comments: Moving arms spontaneously  Eyes open     Psychiatric:         Mood and Affect: Mood normal.         Behavior: Behavior normal.         Thought Content: Thought content normal.         Judgment: Judgment normal.         MEDICAL DECISION MAKING:   Reviewed labs and ct imaging  ANNIE done there is soft stool in rectum, not impacted, mild stool there  He is on stool softeners already  Straight cath UA shows mild UTI  Keflex for this  Do not suspect sepsis  Nontoxic well appearing  Discussed with caregiver anticipatory guidance, discharge instructions, follow up PCP 24 hours           Procedures    DIAGNOSTIC RESULTS       RADIOLOGY:All plain film, CT, MRI, and formal ultrasound images (except ED bedside ultrasound) are read by the radiologist, see reports below, unless otherwisenoted in MDM or here. CT ABDOMEN PELVIS W IV CONTRAST Additional Contrast? None   Preliminary Result   Motion limited study. Large stool volume. Increased stool within the rectal sigmoid region may be   causing partial fecal impaction. No bowel obstruction. No acute   inflammatory process. Incompletely characterized hypodensity within the right hepatic lobe which   may be artifact given motion.   Recommend comparison with any prior outside   studies otherwise MRI can be obtained for definitive evaluation. Enlarged prostate gland. Correlate with PSA values. LABS: All lab results were reviewed by myself, and all abnormals are listed below.   Labs Reviewed   CBC WITH AUTO DIFFERENTIAL - Abnormal; Notable for the following components:       Result Value    WBC 14.8 (*)     Seg Neutrophils 86 (*)     Lymphocytes 7 (*)     Segs Absolute 12.73 (*)     All other components within normal limits   COMPREHENSIVE METABOLIC PANEL - Abnormal; Notable for the following components:    CREATININE 0.68 (*)     Sodium 150 (*)     Chloride 112 (*)     Total Bilirubin 0.19 (*)     All other components within normal limits   URINALYSIS - Abnormal; Notable for the following components:    Specific Gravity, UA 1.043 (*)     pH, UA 8.5 (*)     Leukocyte Esterase, Urine MOD (*)     All other components within normal limits   MICROSCOPIC URINALYSIS - Abnormal; Notable for the following components:    Bacteria, UA FEW (*)     Amorphous, UA 1+ (*)     All other components within normal limits   CULTURE, URINE   LIPASE       EMERGENCY DEPARTMENTCOURSE:         Vitals:    Vitals:    12/14/20 1418 12/14/20 1627   BP: (!) 134/90 109/78   Pulse: 110 95   Resp: 18 15   Temp: 98.5 °F (36.9 °C)    TempSrc: Temporal    SpO2: 97% 97%   Weight: 118 lb (53.5 kg)    Height: 5' 3\" (1.6 m)        The patient was given the following medications while in the emergency department:  Orders Placed This Encounter   Medications    0.9 % sodium chloride bolus    0.9 % sodium chloride bolus    sodium chloride flush 0.9 % injection 10 mL    iopamidol (ISOVUE-370) 76 % injection 75 mL    cephALEXin (KEFLEX) 500 MG capsule     Sig: Take 1 capsule by mouth 2 times daily for 7 days     Dispense:  14 capsule     Refill:  0    cephALEXin (KEFLEX) capsule 500 mg     Order Specific Question:   Antimicrobial Indications     Answer:   Urinary Tract Infection FINAL IMPRESSION      1. Acute cystitis without hematuria    2. Generalized abdominal pain    3.  Constipation, unspecified constipation type          DISPOSITION/PLAN   DISPOSITION Decision To Discharge 12/14/2020 05:57:44 PM      PATIENT REFERRED TO:  Jordan Strange MD  [address]  [phone]    Schedule an appointment as soon as possible for a visit in 1 day      DISCHARGE MEDICATIONS:  New Prescriptions    CEPHALEXIN (KEFLEX) 500 MG CAPSULE    Take 1 capsule by mouth 2 times daily for 7 days     Nasreen Allen MD  Attending Emergency Physician                    Nasreen Allen MD  12/14/20 [id number]

## 2020-12-14 NOTE — ED NOTES
Pt is brought to this ER by one of his caregivers after it was noticed that the pt seemed to have abdominal pain with palpation. It was also reported that the pt's urine had a strong odor. Pt is nonverbal and receives care at the [facility] facility. Pt arrives PMS x 4 intact, eupneic, and PWD. Abdomen is soft et nondistended.        Sheree Cortes RN  12/14/20 9711

## 2020-12-16 LAB
CULTURE: ABNORMAL
Lab: ABNORMAL
SPECIMEN DESCRIPTION: ABNORMAL

## 2021-01-29 ENCOUNTER — VIRTUAL VISIT (OUTPATIENT)
Dept: PEDIATRIC NEUROLOGY | Age: 46
End: 2021-01-29
Payer: MEDICARE

## 2021-01-29 ENCOUNTER — TELEPHONE (OUTPATIENT)
Dept: PEDIATRIC NEUROLOGY | Age: 46
End: 2021-01-29

## 2021-01-29 DIAGNOSIS — G80.3 CHOREOATHETOID CEREBRAL PALSY (HCC): ICD-10-CM

## 2021-01-29 DIAGNOSIS — G40.909 NONINTRACTABLE EPILEPSY WITHOUT STATUS EPILEPTICUS, UNSPECIFIED EPILEPSY TYPE (HCC): Primary | ICD-10-CM

## 2021-01-29 PROCEDURE — G8427 DOCREV CUR MEDS BY ELIG CLIN: HCPCS | Performed by: NURSE PRACTITIONER

## 2021-01-29 PROCEDURE — 1036F TOBACCO NON-USER: CPT | Performed by: NURSE PRACTITIONER

## 2021-01-29 PROCEDURE — G8420 CALC BMI NORM PARAMETERS: HCPCS | Performed by: NURSE PRACTITIONER

## 2021-01-29 PROCEDURE — G8484 FLU IMMUNIZE NO ADMIN: HCPCS | Performed by: NURSE PRACTITIONER

## 2021-01-29 PROCEDURE — 99214 OFFICE O/P EST MOD 30 MIN: CPT | Performed by: NURSE PRACTITIONER

## 2021-01-29 NOTE — LETTER
[facility]   [address]  [facility], [address] [facility] [address]  Phone: [phone]  [phone]      2021      Arlene Stratton MD  [address] [address]    Patient: Teresa Hilton  YOB: 1975  Date of Visit: 2021   MRN:  [id number]      Dear Dr. Jonah Ernandez,      2021    TELEHEALTH EVALUATION -- Audio/Visual (During NAQOC-27 public health emergency)    Patient and Nurse Practitioner are located in their individual homes    HPI:    Teresa Hilton (:  1975)caregiver [facility] has requested an audio/video evaluation for the following concern(s):    EPILEPSY:   Caregiver [facility] from [facility] Elmo Scott has not had any seizures. He has not had any recent hospitalizations. His last witnessed seizure was in . Nithin's last EEG was in 2018 and was abnormal due to generalized slowing as well as epileptiform discharges. The generalized slowing is suggestive of diffuse neuronal dysfunction. In addition, there were occasional sharp waves seen in the right frontal, central, and parietal regions. These waveforms are considered epileptiform in nature and suggest the presence of an epileptogenic focus as well as an increased risk of partial seizures in the future. His seizure description in the past was reported to be grand mal seizures. He remains on Trileptal with no reported side effects or concerns.       CEREBRAL PALSY: Jenniferes Guillens states that Tanner Sainz continues to persist with spasticity. He is wheelchair-bound. The spasticity is worse in the lower extremities. Tanner Sainz can reach and grab objects. He is unable to stand. He will is placed in the stander table on a daily basis. Homer Bhakta requires assistance with activities of daily living and feeding. There have been no concerns for recent regressions. He continues to have restriction of range of motion at the knees. He remains on Zanaflex with no reported side effects. Lai Guillens states medication has been very beneficial in controlling the spasticity. He continues to be involved in physical occupational therapies at [facility] [place]. Previous Medications Tried: Baclofen (ineffective)     REVIEW OF SYSTEMS:  Constitutional: Negative. Eyes: Legally blind. Respiratory: Negative. Cardiovascular: Negative. Gastrointestinal: Negative. Genitourinary: Negative. Musculoskeletal: Positive for spastic Quadriparesis. Skin: Negative. Neurological: Negative for headaches, positive for seizures, positive for developmental delays. Hematological: Negative. Psychiatric/Behavioral: Negative for behavioral issues, Negative for ADHD     All other systems reviewed and are negative. Past, social, family, and developmental history was reviewed and unchanged. PHYSICAL EXAMINATION:  Wheelchair bound. Mild spasticity in upper extremities but no restriction in range of motion. Moderate Spasticity at the hamstrings with restriction in extension. Nonverbal  Constitutional: [x] Appears well-developed and well-nourished. [] Abnormal  Mental status  [x] Alert and awake  [] Oriented to person/place/time []Able to follow commands    [x] No apparent distress      Eyes:  EOM    [x]  Normal  [] Abnormal-  Sclera  [x]  Normal  [] Abnormal -         Discharge [x]  None visible  [] Abnormal -    HENT:   [x] Normocephalic, atraumatic.   [] Abnormal shaped head [x] Mouth/Throat: Mucous membranes are moist. No facial asymmetry. Ears [x] Normal external  inspection of the ears and nose. No lesion, scars or masses. Normal hearing. [] Abnormal-    Neck: [x] Normal range of motion [x] Supple [x] No visualized mass. Pulmonary/Chest: [x] Respiratory effort normal.  [x] No visualized signs of difficulty breathing or respiratory distress        [] Abnormal      Musculoskeletal:   [] Normal range of motion. [] Normal gait with no signs of ataxia. [x]  No signs of cyanosis of the peripheral portions of extremities. [] Abnormal       Neurological:        [x] Normal cranial nerve (limited exam to video visit) [x] No focal weakness        [] Abnormal          Speech       [x] Normal   [] Abnormal     Skin:        [x] No rash on visible skin  [x] Normal  [] Abnormal     Psychiatric:       [x] Normal  [] Abnormal        [x] Normal Mood  [] Anxious appearing        RECORD REVIEW: Previous medical records were reviewed at today's visit. DIAGNOSTIC STUDIES:  11/15/2015 - EEG - This is an abnormal EEG due to generalized slowing as well as epileptiform discharges. The generalized slowing is suggestive of diffuse neuronal dysfunction. In addition, there  were focal discharges seen during the study as mentioned above. These waveforms are considered to be epileptiform in nature. This constellation of findings is consistent with a partial epileptogenic  abnormality and an increased risk of seizures in the future. 12/10/2015 - MRI Brain - Marked white matter volume loss throughout both cerebral hemispheres with marked thinning of the corpus callosum and enlarged lateral ventricles with undulating borders and mild increased signal intensity on FLAIR sequence in the periventricular white matter. 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 Head of the mandibular condyles are markedly enlarged bilaterally. No associated edema or joint effusions evident. Visualized muscles of mastication do not appear enlarged. This is of indeterminate etiology. Correlate for any symptoms referrable to this area. Small amount of fluid right mastoid air cells. Correlate for signs or symptoms of infection. 03/01/2018 - EEG - Abnormal due to generalized slowing as well as epileptiform discharges. The generalized slowing is suggestive of diffuse neuronal dysfunction. In addition, there were occasional sharp waves seen in the right frontal, central, and parietal regions. These waveforms are considered epileptiform in nature and suggest the presence of an epileptogenic focus as well as an increased risk of partial seizures in the future. Results for [facility] (MRN [id number]) as of 2/1/2021 11:04   Ref.  Range 12/14/2020 15:54   Sodium Latest Ref Range: 135 - 144 mmol/L 150 (H)   Potassium Latest Ref Range: 3.7 - 5.3 mmol/L 4.7   Chloride Latest Ref Range: 98 - 107 mmol/L 112 (H)   CO2 Latest Ref Range: 20 - 31 mmol/L 26   BUN Latest Ref Range: 6 - 20 mg/dL 15   Creatinine Latest Ref Range: 0.70 - 1.20 mg/dL 0.68 (L)   Bun/Cre Ratio Latest Ref Range: 9 - 20  NOT REPORTED   Anion Gap Latest Ref Range: 9 - 17 mmol/L 12   GFR Non- Latest Ref Range: >60 mL/min >60   GFR African American Latest Ref Range: >60 mL/min >60   Glucose Latest Ref Range: 70 - 99 mg/dL 93   Calcium Latest Ref Range: 8.6 - 10.4 mg/dL 9.4   Albumin/Globulin Ratio Latest Ref Range: 1.0 - 2.5  NOT REPORTED Total Protein Latest Ref Range: 6.4 - 8.3 g/dL 7.7   GFR Comment Unknown Pend   GFR Staging Unknown NOT REPORTED   Albumin Latest Ref Range: 3.5 - 5.2 g/dL 4.0   Alk Phos Latest Ref Range: 40 - 129 U/L 114   ALT Latest Ref Range: 5 - 41 U/L 29   AST Latest Ref Range: <40 U/L 25   Bilirubin Latest Ref Range: 0.3 - 1.2 mg/dL 0.19 (L)   Lipase Latest Ref Range: 13 - 60 U/L 46   WBC Latest Ref Range: 3.5 - 11.0 k/uL 14.8 (H)   RBC Latest Ref Range: 4.5 - 5.9 m/uL 4.84   Hemoglobin Quant Latest Ref Range: 13.5 - 17.5 g/dL 14.0   Hematocrit Latest Ref Range: 41 - 53 % 44.6   Platelet Count Latest Ref Range: 150 - 450 k/uL 381     ASSESSMENT:   Srikanth Tompkins is a 54 y.o. male with:  1. Epilepsy with the last reported seizure occurring in May 1994.   2. Choreoathetoid cerebral palsy   3. Static Encephalopathy  4. Severe Spastic Quadriparesis  5. Spasticity, which continues to persist.  Marleni Andres states that it is manageable on his current dose of Zanaflex. PLAN:   1. I would again  recommend an EEG to evaluate for epileptiform activity. 2. Continue Tegretol at 800 mg twice daily. 3. Continue Zanaflex at  10 mg three times a day. 4. The use of Diastat 12.5 mg to be administered rectally for seizures lasting more than 3 minutes was discussed with the caregiver. 5. Blood work including CBC, AST, ALT, Lytes and Tegretol levels is also recommended every 4 months. 6. Seizure precautions were recommended to be maintained. The caregiver was instructed to notify our clinic if Monroe Ford has any breakthrough seizures for an earlier appointment. 7. Anticipatory guidance and preventative counseling was provided regarding seizure precautions; and first aid measures during seizures was discussed in detail.    8. I plan to see Monroe Ford back in 6 months or earlier if needed

## 2021-01-29 NOTE — PATIENT INSTRUCTIONS
PLAN:   1. I would again  recommend an EEG to evaluate for epileptiform activity. 2. Continue Tegretol at 800 mg twice daily. 3. Continue Zanaflex at  8 mg three times a day. 4. The use of Diastat 12.5 mg to be administered rectally for seizures lasting more than 3 minutes was discussed with the caregiver. 5. Blood work including CBC, AST, ALT, Lytes and Tegretol levels is also recommended every 4 months. 6. Seizure precautions were recommended to be maintained. The caregiver was instructed to notify our clinic if Ela Mon has any breakthrough seizures for an earlier appointment. 7. Anticipatory guidance and preventative counseling was provided regarding seizure precautions; and first aid measures during seizures was discussed in detail.    8. I plan to see Ela Mon back in 6 months or earlier if needed

## 2021-01-29 NOTE — PROGRESS NOTES
Cardiovascular: Negative. Gastrointestinal: Negative. Genitourinary: Negative. Musculoskeletal: Positive for spastic Quadriparesis. Skin: Negative. Neurological: Negative for headaches, positive for seizures, positive for developmental delays. Hematological: Negative. Psychiatric/Behavioral: Negative for behavioral issues, Negative for ADHD     All other systems reviewed and are negative. Past, social, family, and developmental history was reviewed and unchanged. PHYSICAL EXAMINATION:  Wheelchair bound. Mild spasticity in upper extremities but no restriction in range of motion. Moderate Spasticity at the hamstrings with restriction in extension. Nonverbal  Constitutional: [x] Appears well-developed and well-nourished. [] Abnormal  Mental status  [x] Alert and awake  [] Oriented to person/place/time []Able to follow commands    [x] No apparent distress      Eyes:  EOM    [x]  Normal  [] Abnormal-  Sclera  [x]  Normal  [] Abnormal -         Discharge [x]  None visible  [] Abnormal -    HENT:   [x] Normocephalic, atraumatic. [] Abnormal shaped head   [x] Mouth/Throat: Mucous membranes are moist. No facial asymmetry. Ears [x] Normal external  inspection of the ears and nose. No lesion, scars or masses. Normal hearing. [] Abnormal-    Neck: [x] Normal range of motion [x] Supple [x] No visualized mass. Pulmonary/Chest: [x] Respiratory effort normal.  [x] No visualized signs of difficulty breathing or respiratory distress        [] Abnormal      Musculoskeletal:   [] Normal range of motion. [] Normal gait with no signs of ataxia. [x]  No signs of cyanosis of the peripheral portions of extremities.          [] Abnormal       Neurological:        [x] Normal cranial nerve (limited exam to video visit) [x] No focal weakness        [] Abnormal          Speech       [x] Normal   [] Abnormal     Skin:        [x] No rash on visible skin  [x] Normal  [] Abnormal Psychiatric:       [x] Normal  [] Abnormal        [x] Normal Mood  [] Anxious appearing        RECORD REVIEW: Previous medical records were reviewed at today's visit. DIAGNOSTIC STUDIES:  11/15/2015 - EEG - This is an abnormal EEG due to generalized slowing as well as epileptiform discharges. The generalized slowing is suggestive of diffuse neuronal dysfunction. In addition, there  were focal discharges seen during the study as mentioned above. These waveforms are considered to be epileptiform in nature. This constellation of findings is consistent with a partial epileptogenic  abnormality and an increased risk of seizures in the future. 12/10/2015 - MRI Brain - Marked white matter volume loss throughout both cerebral hemispheres with marked thinning of the corpus callosum and enlarged lateral ventricles with undulating borders and mild increased signal intensity on FLAIR sequence in the periventricular white matter. 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 Head of the mandibular condyles are markedly enlarged bilaterally. No associated edema or joint effusions evident. Visualized muscles of mastication do not appear enlarged. This is of indeterminate etiology. Correlate for any symptoms referrable to this area. Small amount of fluid right mastoid air cells. Correlate for signs or symptoms of infection. 03/01/2018 - EEG - Abnormal due to generalized slowing as well as epileptiform discharges. The generalized slowing is suggestive of diffuse neuronal dysfunction. In addition, there were occasional sharp waves seen in the right frontal, central, and parietal regions. These waveforms are considered epileptiform in nature and suggest the presence of an epileptogenic focus as well as an increased risk of partial seizures in the future. Results for Ledy Chrystal (MRN [id number]) as of 2/1/2021 11:04   Ref.  Range 12/14/2020 15:54 Sodium Latest Ref Range: 135 - 144 mmol/L 150 (H)   Potassium Latest Ref Range: 3.7 - 5.3 mmol/L 4.7   Chloride Latest Ref Range: 98 - 107 mmol/L 112 (H)   CO2 Latest Ref Range: 20 - 31 mmol/L 26   BUN Latest Ref Range: 6 - 20 mg/dL 15   Creatinine Latest Ref Range: 0.70 - 1.20 mg/dL 0.68 (L)   Bun/Cre Ratio Latest Ref Range: 9 - 20  NOT REPORTED   Anion Gap Latest Ref Range: 9 - 17 mmol/L 12   GFR Non- Latest Ref Range: >60 mL/min >60   GFR African American Latest Ref Range: >60 mL/min >60   Glucose Latest Ref Range: 70 - 99 mg/dL 93   Calcium Latest Ref Range: 8.6 - 10.4 mg/dL 9.4   Albumin/Globulin Ratio Latest Ref Range: 1.0 - 2.5  NOT REPORTED   Total Protein Latest Ref Range: 6.4 - 8.3 g/dL 7.7   GFR Comment Unknown Pend   GFR Staging Unknown NOT REPORTED   Albumin Latest Ref Range: 3.5 - 5.2 g/dL 4.0   Alk Phos Latest Ref Range: 40 - 129 U/L 114   ALT Latest Ref Range: 5 - 41 U/L 29   AST Latest Ref Range: <40 U/L 25   Bilirubin Latest Ref Range: 0.3 - 1.2 mg/dL 0.19 (L)   Lipase Latest Ref Range: 13 - 60 U/L 46   WBC Latest Ref Range: 3.5 - 11.0 k/uL 14.8 (H)   RBC Latest Ref Range: 4.5 - 5.9 m/uL 4.84   Hemoglobin Quant Latest Ref Range: 13.5 - 17.5 g/dL 14.0   Hematocrit Latest Ref Range: 41 - 53 % 44.6   Platelet Count Latest Ref Range: 150 - 450 k/uL 381     ASSESSMENT:   Isaura Garcia is a 54 y.o. male with:  1. Epilepsy with the last reported seizure occurring in May 1994.   2. Choreoathetoid cerebral palsy   3. Static Encephalopathy  4. Severe Spastic Quadriparesis  5. Spasticity, which continues to persist.  Estlea Calloway states that it is manageable on his current dose of Zanaflex. PLAN:   1. I would again  recommend an EEG to evaluate for epileptiform activity. 2. Continue Tegretol at 800 mg twice daily. 3. Continue Zanaflex at  10 mg three times a day. 4. The use of Diastat 12.5 mg to be administered rectally for seizures lasting more than 3 minutes was discussed with the caregiver. 5. Blood work including CBC, AST, ALT, Lytes and Tegretol levels is also recommended every 4 months. 6. Seizure precautions were recommended to be maintained. The caregiver was instructed to notify our clinic if Princess Rashid has any breakthrough seizures for an earlier appointment. 7. Anticipatory guidance and preventative counseling was provided regarding seizure precautions; and first aid measures during seizures was discussed in detail. 8. I plan to see Princess Rashid back in 6 months or earlier if needed    Lauren De Dios is a 54 y.o. male being evaluated in the presence of his caregiver by a video visit encounter for neurological concerns as above. Due to this being a TeleHealth encounter (During DNOZU-75 public health emergency), evaluation of the following organ systems is limited: Vitals/Constitutional/EENT/Resp/CV/GI//MS/Neuro/Skin/Heme-Lymph-Imm. Patient and provider were located at home. Pursuant to the emergency declaration under the [facility], [facility]5 waiver authority and the [employer] and [employer] Act, this Virtual  Visit was conducted, with patient's consent, to reduce the patient's risk of exposure to COVID-19 and provide continuity of care for an established patient. Services were provided through a video synchronous discussion virtually to substitute for in-person clinic visit. --PITA Lane CNP on 2/1/2021 at 11:05 AM    An  electronic signature was used to authenticate this note.

## 2021-01-29 NOTE — TELEPHONE ENCOUNTER
Writer called Ale Martínez, caregiver at [facility]. Attempted to schedule EEG, however, Ale Martínez said she is \" book up until mid March\"  and would like to have the EEG completed at [facility] during EEG clinic. Writer spoke with Anthony Quinones EEG tech who stated that the EEG clinic will not be starting up again until mid March 21 due to COVID-19. Ale Martínez states  That's fine and she will wait for the clinic and schedule it during that time. Juli notified that Anthony Quinones EEG Tech stated that the patient did not tolerate the procedure in the past. Ale Kirstydorota is aware and is willing to try again. Writer scheduled 6 month virtual visit with Dr Jasmin Coffey. Updates will be faxed to [facility] [phone].

## 2021-04-01 ENCOUNTER — HOSPITAL ENCOUNTER (OUTPATIENT)
Age: 46
Setting detail: SPECIMEN
Discharge: HOME | End: 2021-04-01
Payer: MEDICARE

## 2021-04-01 LAB
ALBUMIN SERPL-MCNC: 3.8 G/DL (ref 3.5–5.2)
ALBUMIN/GLOBULIN RATIO: 1.2 (ref 1–2.5)
ALP BLD-CCNC: 126 U/L (ref 40–129)
ALT SERPL-CCNC: 55 U/L (ref 5–41)
AST SERPL-CCNC: 49 U/L
BILIRUB SERPL-MCNC: 0.18 MG/DL (ref 0.3–1.2)
BILIRUBIN DIRECT: <0.08 MG/DL
BILIRUBIN, INDIRECT: ABNORMAL MG/DL (ref 0–1)
GLOBULIN: ABNORMAL G/DL (ref 1.5–3.8)
TOTAL PROTEIN: 7.1 G/DL (ref 6.4–8.3)

## 2021-04-01 PROCEDURE — 36415 COLL VENOUS BLD VENIPUNCTURE: CPT

## 2021-04-01 PROCEDURE — P9603 ONE-WAY ALLOW PRORATED MILES: HCPCS

## 2021-04-01 PROCEDURE — 80076 HEPATIC FUNCTION PANEL: CPT

## 2021-06-02 ENCOUNTER — TELEPHONE (OUTPATIENT)
Dept: PEDIATRIC NEUROLOGY | Age: 46
End: 2021-06-02

## 2021-06-02 DIAGNOSIS — G40.909 NONINTRACTABLE EPILEPSY WITHOUT STATUS EPILEPTICUS, UNSPECIFIED EPILEPSY TYPE (HCC): Primary | ICD-10-CM

## 2021-06-04 ENCOUNTER — HOSPITAL ENCOUNTER (OUTPATIENT)
Age: 46
Setting detail: SPECIMEN
Discharge: HOME | End: 2021-06-04
Payer: MEDICARE

## 2021-06-04 LAB
ABSOLUTE EOS #: 0.13 K/UL (ref 0–0.44)
ABSOLUTE IMMATURE GRANULOCYTE: <0.03 K/UL (ref 0–0.3)
ABSOLUTE LYMPH #: 1.67 K/UL (ref 1.1–3.7)
ABSOLUTE MONO #: 0.54 K/UL (ref 0.1–1.2)
ALBUMIN SERPL-MCNC: 4.7 G/DL (ref 3.5–5.2)
ALBUMIN/GLOBULIN RATIO: 1.7 (ref 1–2.5)
ALP BLD-CCNC: 116 U/L (ref 40–129)
ALT SERPL-CCNC: 29 U/L (ref 5–41)
ANION GAP SERPL CALCULATED.3IONS-SCNC: 18 MMOL/L (ref 9–17)
AST SERPL-CCNC: 28 U/L
BASOPHILS # BLD: 1 % (ref 0–2)
BASOPHILS ABSOLUTE: 0.05 K/UL (ref 0–0.2)
BILIRUB SERPL-MCNC: 0.3 MG/DL (ref 0.3–1.2)
BUN BLDV-MCNC: 20 MG/DL (ref 6–20)
BUN/CREAT BLD: ABNORMAL (ref 9–20)
CALCIUM SERPL-MCNC: 9.7 MG/DL (ref 8.6–10.4)
CHLORIDE BLD-SCNC: 114 MMOL/L (ref 98–107)
CO2: 25 MMOL/L (ref 20–31)
CREAT SERPL-MCNC: 0.67 MG/DL (ref 0.7–1.2)
DIFFERENTIAL TYPE: NORMAL
EOSINOPHILS RELATIVE PERCENT: 2 % (ref 1–4)
GFR AFRICAN AMERICAN: >60 ML/MIN
GFR NON-AFRICAN AMERICAN: >60 ML/MIN
GFR SERPL CREATININE-BSD FRML MDRD: ABNORMAL ML/MIN/{1.73_M2}
GFR SERPL CREATININE-BSD FRML MDRD: ABNORMAL ML/MIN/{1.73_M2}
GLUCOSE BLD-MCNC: 82 MG/DL (ref 70–99)
HCT VFR BLD CALC: 50 % (ref 40.7–50.3)
HEMOGLOBIN: 15.5 G/DL (ref 13–17)
IMMATURE GRANULOCYTES: 0 %
LYMPHOCYTES # BLD: 25 % (ref 24–43)
MCH RBC QN AUTO: 30.3 PG (ref 25.2–33.5)
MCHC RBC AUTO-ENTMCNC: 31 G/DL (ref 28.4–34.8)
MCV RBC AUTO: 97.7 FL (ref 82.6–102.9)
MONOCYTES # BLD: 8 % (ref 3–12)
NRBC AUTOMATED: 0 PER 100 WBC
PDW BLD-RTO: 12.7 % (ref 11.8–14.4)
PLATELET # BLD: 229 K/UL (ref 138–453)
PLATELET ESTIMATE: NORMAL
PMV BLD AUTO: 10.7 FL (ref 8.1–13.5)
POTASSIUM SERPL-SCNC: 5.4 MMOL/L (ref 3.7–5.3)
RBC # BLD: 5.12 M/UL (ref 4.21–5.77)
RBC # BLD: NORMAL 10*6/UL
SEG NEUTROPHILS: 64 % (ref 36–65)
SEGMENTED NEUTROPHILS ABSOLUTE COUNT: 4.23 K/UL (ref 1.5–8.1)
SODIUM BLD-SCNC: 157 MMOL/L (ref 135–144)
TOTAL PROTEIN: 7.5 G/DL (ref 6.4–8.3)
WBC # BLD: 6.6 K/UL (ref 3.5–11.3)
WBC # BLD: NORMAL 10*3/UL

## 2021-06-04 PROCEDURE — 85025 COMPLETE CBC W/AUTO DIFF WBC: CPT

## 2021-06-04 PROCEDURE — 80157 ASSAY CARBAMAZEPINE FREE: CPT

## 2021-06-04 PROCEDURE — 80156 ASSAY CARBAMAZEPINE TOTAL: CPT

## 2021-06-04 PROCEDURE — 80053 COMPREHEN METABOLIC PANEL: CPT

## 2021-06-04 PROCEDURE — 36415 COLL VENOUS BLD VENIPUNCTURE: CPT

## 2021-06-04 PROCEDURE — P9603 ONE-WAY ALLOW PRORATED MILES: HCPCS

## 2021-06-06 LAB
MISCELLANEOUS LAB TEST RESULT: NORMAL
TEST NAME: NORMAL

## 2021-06-07 ENCOUNTER — TELEPHONE (OUTPATIENT)
Dept: PEDIATRIC NEUROLOGY | Age: 46
End: 2021-06-07

## 2021-06-07 DIAGNOSIS — G40.909 NONINTRACTABLE EPILEPSY WITHOUT STATUS EPILEPTICUS, UNSPECIFIED EPILEPSY TYPE (HCC): ICD-10-CM

## 2021-06-07 LAB
ALBUMIN SERPL-MCNC: 4.7 G/DL
ALP BLD-CCNC: 116 U/L
ALT SERPL-CCNC: 29 U/L
ANION GAP SERPL CALCULATED.3IONS-SCNC: 18 MMOL/L
AST SERPL-CCNC: 28 U/L
BASOPHILS ABSOLUTE: NORMAL
BASOPHILS RELATIVE PERCENT: NORMAL
BILIRUB SERPL-MCNC: 0.3 MG/DL (ref 0.1–1.4)
BUN BLDV-MCNC: 20 MG/DL
CALCIUM SERPL-MCNC: 9.7 MG/DL
CHLORIDE BLD-SCNC: 114 MMOL/L
CO2: 25 MMOL/L
CREAT SERPL-MCNC: 0.67 MG/DL
EOSINOPHILS ABSOLUTE: NORMAL
EOSINOPHILS RELATIVE PERCENT: NORMAL
GFR CALCULATED: >60
GLUCOSE BLD-MCNC: 82 MG/DL
HCT VFR BLD CALC: 50 % (ref 41–53)
HEMOGLOBIN: 15.5 G/DL (ref 13.5–17.5)
LYMPHOCYTES ABSOLUTE: NORMAL
LYMPHOCYTES RELATIVE PERCENT: NORMAL
MCH RBC QN AUTO: NORMAL PG
MCHC RBC AUTO-ENTMCNC: NORMAL G/DL
MCV RBC AUTO: NORMAL FL
MONOCYTES ABSOLUTE: NORMAL
MONOCYTES RELATIVE PERCENT: NORMAL
NEUTROPHILS ABSOLUTE: NORMAL
NEUTROPHILS RELATIVE PERCENT: NORMAL
PDW BLD-RTO: NORMAL %
PLATELET # BLD: 229 K/ΜL
PMV BLD AUTO: NORMAL FL
POTASSIUM SERPL-SCNC: 5.4 MMOL/L
RBC # BLD: 5.12 10^6/ΜL
SODIUM BLD-SCNC: 157 MMOL/L
TOTAL PROTEIN: 7.5
WBC # BLD: 6.6 10^3/ML

## 2021-06-07 NOTE — TELEPHONE ENCOUNTER
----- Message from PITA Renee CNP sent at 6/7/2021  8:27 AM EDT -----  Labs within normal limits.   Notify parents

## 2021-06-07 NOTE — TELEPHONE ENCOUNTER
April, caregiver notified of labs within normal limits and she verbalized understanding. The patient was seen and examined by me in conjunction with the mid-level provider. I agree with his/her assessment and treatment plan. The patient is now awake and at his neurologic baseline and is able to be released. Possibility of having had a seizure is contemplated.      Seth Mcdaniel MD  01/10/20 2040

## 2021-07-29 ENCOUNTER — VIRTUAL VISIT (OUTPATIENT)
Dept: PEDIATRIC NEUROLOGY | Age: 46
End: 2021-07-29
Payer: MEDICARE

## 2021-07-29 DIAGNOSIS — G80.3 CHOREOATHETOID CEREBRAL PALSY (HCC): ICD-10-CM

## 2021-07-29 DIAGNOSIS — G40.B09 NONINTRACTABLE JUVENILE MYOCLONIC EPILEPSY WITHOUT STATUS EPILEPTICUS (HCC): Primary | Chronic | ICD-10-CM

## 2021-07-29 PROCEDURE — 99214 OFFICE O/P EST MOD 30 MIN: CPT | Performed by: PSYCHIATRY & NEUROLOGY

## 2021-07-29 PROCEDURE — G8427 DOCREV CUR MEDS BY ELIG CLIN: HCPCS | Performed by: PSYCHIATRY & NEUROLOGY

## 2021-07-29 RX ORDER — PANTOPRAZOLE SODIUM 40 MG/1
40 TABLET, DELAYED RELEASE ORAL DAILY
COMMUNITY

## 2021-07-29 NOTE — LETTER
[facility]   [address]  [place], [address] [place] [address]  Phone: [phone]  [phone]        7/29/2021      Alyssa Zarco MD  [address] [address]    Patient: Nora Nicole  YOB: 1975  Date of Visit: 7/29/2021  MRN:  [id number]      Dear Dr. Alyssa Zarco MD      SUBJECTIVE:   It was a pleasure to see Nora Nicole  in the Pediatric Neurology Clinic at [facility]. He is a 54 y.o. male accompanied by his caregiver Jerry Branch to this visit for a follow-up neurological evaluation. INTERIM PROGRESS:  EPILEPSY:   Juli, caregiver at [facility] denies witnessing Jaylin Bingham to have had any breakthrough seizures since the last visit in January 2021. She denies any recent hospitalizations. His last witnessed seizure occurred in 1994. His last EEG was completed in March 2018, which was an abnormal EEG due to generalized slowing as well as epileptiform discharges.  The generalized slowing is suggestive of diffuse neuronal dysfunction.  In addition, there were occasional sharp waves seen in the right frontal, central, and parietal regions.  These waveforms are considered epileptiform in nature and suggest the presence of an epileptogenic focus as well as an increased risk of partial seizures in the future. His seizure description in the past was reported as grand mal seizures. He continues to take Trileptal in this regard, without any reports of side effects or concerns. CEREBRAL PALSY:  Lacy Him reports that the cerebral palsy continues to persist with spasticity. Jaylin Bingham is wheelchair-bound. Lacy Him states the spasticity is greater in the lower extremities. Jaylin Bingham is able to reach for objects. He is unable to stand unassisted. He uses a stander. Jayson Benavides continues to require assistance with his feedings and ADL's. Lacy Him denies any concerns for any recent regressions.  Jaylin Bingham continues to experience restricted range of motion at the knee. He continues to receive physical and occupational therapies at [facility]. Tracy Herrera He continues to take Zanaflex in this regard with no reported side effects or concerns. Previous Medications Tried: Baclofen (ineffective)    REVIEW OF SYSTEMS:  Constitutional: Negative. Eyes: Legally blind. Respiratory: Negative. Cardiovascular: Negative. Gastrointestinal: Negative. Genitourinary: Negative. Musculoskeletal: Positive for spastic Quadriparesis. Skin: Negative. Neurological: Negative for headaches, positive for seizures, positive for developmental delays. Hematological: Negative. Psychiatric/Behavioral: Negative for behavioral issues, Negative for ADHD     All other systems reviewed and are negative. Past, social, family, and developmental history was reviewed and unchanged. OBJECTIVE:   PHYSICAL EXAM:  There were no vitals taken for this visit. Neurological: he is alert. Ginette Beverly sat in his wheelchair comfortably. He continued to exhibits some lip smacking movements and smiled at the examiners spontaneously. The tone was noted to be increased in the extremities. He made unintelligible sounds and shouted sometimes during the visit. Mild amount of spasticity was noted all over in the upper extremities with no restriction of range of motion. He was able to move his hands above his shoulder level. He was able to lift his lower extremities in the air, but there was significant spasticity noted in the hamstrings which made it difficult to extend the lower extremity. Reflex Scores: 3+ diffuse. No focal weakness noted on exam.      Constitutional: [x] Appears well-developed and well-nourished [x] No apparent distress      [] Abnormal-   Mental status  [x] Alert and awake  [] Oriented to person/place/time [x]Unable to follow commands at baseline.       Eyes:  EOM    [x]  Normal  [] Abnormal-  Sclera  [x]  Normal  [] Abnormal -         Discharge [x]  None visible  [] Abnormal -    HENT:   [x] Normocephalic, atraumatic. [] Abnormal   [x] Mouth/Throat: Mucous membranes are moist.     External Ears [x] Normal  [] Abnormal-     Neck: [x] No visualized mass     Pulmonary/Chest: [x] Respiratory effort normal.  [x] No visualized signs of difficulty breathing or respiratory distress        [] Abnormal-      Musculoskeletal:   [] Normal gait with no signs of ataxia         [x] Normal range of motion of neck        [x] Abnormal-  As above    Neurological:        [x] No Facial Asymmetry (Cranial nerve 7 motor function) (limited exam to video visit)          [x] No gaze palsy        [] Abnormal-         Skin:        [x] No significant exanthematous lesions or discoloration noted on facial skin         [] Abnormal-            Psychiatric:       [] Normal Affect [] No Hallucinations        [x] Abnormal- As above      RECORD REVIEW: Previous medical records were reviewed at today's visit. DIAGNOSTIC STUDIES:  11/15/2015 - EEG - This is an abnormal EEG due to generalized slowing as well as epileptiform discharges.  The generalized slowing is suggestive of diffuse neuronal dysfunction.  In addition, there  were focal discharges seen during the study as mentioned above. These waveforms are considered to be epileptiform in nature.  This constellation of findings is consistent with a partial epileptogenic  abnormality and an increased risk of seizures in the future. 12/10/2015 - MRI Brain - Marked white matter volume loss throughout both cerebral hemispheres with marked thinning of the corpus callosum and enlarged lateral ventricles with undulating borders and mild increased signal intensity on FLAIR sequence in the periventricular white matter. 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 Head of the mandibular condyles are markedly enlarged bilaterally. No associated edema or joint effusions evident. Visualized muscles of mastication do not appear enlarged.  This is of indeterminate etiology. Correlate for any symptoms referrable to this area. Small amount of fluid right mastoid air cells. Correlate for signs or symptoms of infection. 03/01/2018 - EEG - Abnormal due to generalized slowing as well as epileptiform discharges.  The generalized slowing is suggestive of diffuse neuronal dysfunction.  In addition, there were occasional sharp waves seen in the right frontal, central, and parietal regions.  These waveforms are considered epileptiform in nature and suggest the presence of an epileptogenic focus as well as an increased risk of partial s     Ref. Range 6/4/2021 09:16   Sodium Latest Ref Range: 135 - 144 mmol/L 157 (H)   Potassium Latest Ref Range: 3.7 - 5.3 mmol/L 5.4 (H)   Chloride Latest Ref Range: 98 - 107 mmol/L 114 (H)   CO2 Latest Ref Range: 20 - 31 mmol/L 25   BUN Latest Ref Range: 6 - 20 mg/dL 20   Creatinine Latest Ref Range: 0.70 - 1.20 mg/dL 0.67 (L)   Glucose Latest Ref Range: 70 - 99 mg/dL 82   Calcium Latest Ref Range: 8.6 - 10.4 mg/dL 9.7   Albumin/Globulin Ratio Latest Ref Range: 1.0 - 2.5  1.7   Total Protein Latest Ref Range: 6.4 - 8.3 g/dL 7.5   Albumin Latest Ref Range: 3.5 - 5.2 g/dL 4.7   Alk Phos Latest Ref Range: 40 - 129 U/L 116   ALT Latest Ref Range: 5 - 41 U/L 29   AST Latest Ref Range: <40 U/L 28   Bilirubin Latest Ref Range: 0.3 - 1.2 mg/dL 0.30   WBC Latest Ref Range: 3.5 - 11.3 k/uL 6.6   RBC Latest Ref Range: 4.21 - 5.77 m/uL 5.12   Hemoglobin Quant Latest Ref Range: 13.0 - 17.0 g/dL 15.5   Hematocrit Latest Ref Range: 40.7 - 50.3 % 50.0   Platelet Count Latest Ref Range: 138 - 453 k/uL 229       ASSESSMENT:   Giuseppe Doherty is a 54 y.o. male with:  1. Epilepsy with the last reported seizure occurring in May 1994.   2. Choreoathetoid cerebral palsy   3. Static Encephalopathy  4. Severe Spastic Quadriparesis  5. Spasticity, which continues to remain a concern.      PLAN:   1. I would again recommend an EEG to evaluate for epileptiform activity. '  2. Continue Tegretol at 800 mg twice daily. 3. Continue Zanaflex  10 mg three times a day. 4. Recommend to get Electrolytes, CBC, AST, ALT every 6 months; as well as one within the next week. 5. The use of Diastat 12.5 mg to be administered rectally for seizures lasting more than 3 minutes was discussed with the caregiver. 6. Seizure precautions were recommended to be maintained. The caregiver was instructed to notify our clinic if Simona Tovar has any breakthrough seizures for an earlier appointment. 7. Anticipatory guidance and preventative counseling was provided regarding seizure precautions; and first aid measures during seizures was discussed in detail. 8. I plan to see Simona Tovar back in  6 months or earlier if needed. Written by Roscoe Nails RN acting as scribe for Dr. Simone Romo. 7/29/2021  8:13 AM    I have reviewed and made changes accordingly to the work scribed by Roscoe Nails RN. The documentation accurately reflects work and decisions made by me. Jaimee Baltazar MD   Pediatric Neurology & Epilepsy  7/29/2021        Meredith Du is a 54 y.o. male being evaluated in the presence of his caregiver by a video visit encounter for neurological concerns as above. Due to this being a TeleHealth encounter ([facility]), evaluation of the following organ systems is limited: Vitals/Constitutional/EENT/Resp/CV/GI//MS/Neuro/Skin/Heme-Lymph-Imm. Patient and provider were located at home. Pursuant to the emergency declaration under the [facility], [facility] waiver authority and the [employer] Act, this Virtual  Visit was conducted, with patient's consent, to reduce the patient's risk of exposure to COVID-19 and provide continuity of care for an established patient.     Services were provided through a video synchronous discussion virtually to substitute for in-person clinic visit.    --Heather Valente MD on 7/29/2021 at 8:40 AM    An  electronic signature was used to authenticate this note. If you have any questions or concerns, please feel free to call me. Thank you again for referring this patient to be seen in our clinic.     Sincerely,        Va Irvin MD

## 2021-07-29 NOTE — PROGRESS NOTES
SUBJECTIVE:   It was a pleasure to see Anitha Avelar  in the Pediatric Neurology Clinic at [facility]. He is a 54 y.o. male accompanied by his caregiver Mortimer Saha to this visit for a follow-up neurological evaluation. INTERIM PROGRESS:  EPILEPSY:   Juli, caregiver at [facility] denies witnessing Pradeep Fernández to have had any breakthrough seizures since the last visit in January 2021. She denies any recent hospitalizations. His last witnessed seizure occurred in 1994. His last EEG was completed in March 2018, which was an abnormal EEG due to generalized slowing as well as epileptiform discharges.  The generalized slowing is suggestive of diffuse neuronal dysfunction.  In addition, there were occasional sharp waves seen in the right frontal, central, and parietal regions.  These waveforms are considered epileptiform in nature and suggest the presence of an epileptogenic focus as well as an increased risk of partial seizures in the future. His seizure description in the past was reported as grand mal seizures. He continues to take Trileptal in this regard, without any reports of side effects or concerns. CEREBRAL PALSY:  Macy Ervin reports that the cerebral palsy continues to persist with spasticity. Praedep Fernández is wheelchair-bound. Macy Ervin states the spasticity is greater in the lower extremities. Pradeep Fernández is able to reach for objects. He is unable to stand unassisted. He uses a stander. Mike Cole continues to require assistance with his feedings and ADL's. Macy Ervin denies any concerns for any recent regressions. Pradeep Fernández continues to experience restricted range of motion at the knee. He continues to receive physical and occupational therapies at [facility]. Jayme  He continues to take Zanaflex in this regard with no reported side effects or concerns. Previous Medications Tried: Baclofen (ineffective)    REVIEW OF SYSTEMS:  Constitutional: Negative. Eyes: Legally blind. Respiratory: Negative.    Cardiovascular: Negative. Gastrointestinal: Negative. Genitourinary: Negative. Musculoskeletal: Positive for spastic Quadriparesis. Skin: Negative. Neurological: Negative for headaches, positive for seizures, positive for developmental delays. Hematological: Negative. Psychiatric/Behavioral: Negative for behavioral issues, Negative for ADHD     All other systems reviewed and are negative. Past, social, family, and developmental history was reviewed and unchanged. OBJECTIVE:   PHYSICAL EXAM:  There were no vitals taken for this visit. Neurological: he is alert. Joaquin Pierce sat in his wheelchair comfortably. He continued to exhibits some lip smacking movements and smiled at the examiners spontaneously. The tone was noted to be increased in the extremities. He made unintelligible sounds and shouted sometimes during the visit. Mild amount of spasticity was noted all over in the upper extremities with no restriction of range of motion. He was able to move his hands above his shoulder level. He was able to lift his lower extremities in the air, but there was significant spasticity noted in the hamstrings which made it difficult to extend the lower extremity. Reflex Scores: 3+ diffuse. No focal weakness noted on exam.      Constitutional: [x] Appears well-developed and well-nourished [x] No apparent distress      [] Abnormal-   Mental status  [x] Alert and awake  [] Oriented to person/place/time [x]Unable to follow commands at baseline. Eyes:  EOM    [x]  Normal  [] Abnormal-  Sclera  [x]  Normal  [] Abnormal -         Discharge [x]  None visible  [] Abnormal -    HENT:   [x] Normocephalic, atraumatic.   [] Abnormal   [x] Mouth/Throat: Mucous membranes are moist.     External Ears [x] Normal  [] Abnormal-     Neck: [x] No visualized mass     Pulmonary/Chest: [x] Respiratory effort normal.  [x] No visualized signs of difficulty breathing or respiratory distress        [] Abnormal-      Musculoskeletal:   [] Normal gait with no signs of ataxia         [x] Normal range of motion of neck        [x] Abnormal-  As above    Neurological:        [x] No Facial Asymmetry (Cranial nerve 7 motor function) (limited exam to video visit)          [x] No gaze palsy        [] Abnormal-         Skin:        [x] No significant exanthematous lesions or discoloration noted on facial skin         [] Abnormal-            Psychiatric:       [] Normal Affect [] No Hallucinations        [x] Abnormal- As above      RECORD REVIEW: Previous medical records were reviewed at today's visit. DIAGNOSTIC STUDIES:  11/15/2015 - EEG - This is an abnormal EEG due to generalized slowing as well as epileptiform discharges.  The generalized slowing is suggestive of diffuse neuronal dysfunction.  In addition, there  were focal discharges seen during the study as mentioned above. These waveforms are considered to be epileptiform in nature.  This constellation of findings is consistent with a partial epileptogenic  abnormality and an increased risk of seizures in the future. 12/10/2015 - MRI Brain - Marked white matter volume loss throughout both cerebral hemispheres with marked thinning of the corpus callosum and enlarged lateral ventricles with undulating borders and mild increased signal intensity on FLAIR sequence in the periventricular white matter. 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 Head of the mandibular condyles are markedly enlarged bilaterally. No associated edema or joint effusions evident. Visualized muscles of mastication do not appear enlarged. This is of indeterminate etiology. Correlate for any symptoms referrable to this area. Small amount of fluid right mastoid air cells. Correlate for signs or symptoms of infection.   03/01/2018 - EEG - Abnormal due to generalized slowing as well as epileptiform discharges.  The generalized slowing is suggestive of diffuse neuronal dysfunction.  In addition, there were occasional sharp waves seen in the right frontal, central, and parietal regions.  These waveforms are considered epileptiform in nature and suggest the presence of an epileptogenic focus as well as an increased risk of partial s     Ref. Range 6/4/2021 09:16   Sodium Latest Ref Range: 135 - 144 mmol/L 157 (H)   Potassium Latest Ref Range: 3.7 - 5.3 mmol/L 5.4 (H)   Chloride Latest Ref Range: 98 - 107 mmol/L 114 (H)   CO2 Latest Ref Range: 20 - 31 mmol/L 25   BUN Latest Ref Range: 6 - 20 mg/dL 20   Creatinine Latest Ref Range: 0.70 - 1.20 mg/dL 0.67 (L)   Glucose Latest Ref Range: 70 - 99 mg/dL 82   Calcium Latest Ref Range: 8.6 - 10.4 mg/dL 9.7   Albumin/Globulin Ratio Latest Ref Range: 1.0 - 2.5  1.7   Total Protein Latest Ref Range: 6.4 - 8.3 g/dL 7.5   Albumin Latest Ref Range: 3.5 - 5.2 g/dL 4.7   Alk Phos Latest Ref Range: 40 - 129 U/L 116   ALT Latest Ref Range: 5 - 41 U/L 29   AST Latest Ref Range: <40 U/L 28   Bilirubin Latest Ref Range: 0.3 - 1.2 mg/dL 0.30   WBC Latest Ref Range: 3.5 - 11.3 k/uL 6.6   RBC Latest Ref Range: 4.21 - 5.77 m/uL 5.12   Hemoglobin Quant Latest Ref Range: 13.0 - 17.0 g/dL 15.5   Hematocrit Latest Ref Range: 40.7 - 50.3 % 50.0   Platelet Count Latest Ref Range: 138 - 453 k/uL 229       ASSESSMENT:   Rebecca Loco is a 54 y.o. male with:  1. Epilepsy with the last reported seizure occurring in May 1994.   2. Choreoathetoid cerebral palsy   3. Static Encephalopathy  4. Severe Spastic Quadriparesis  5. Spasticity, which continues to remain a concern. PLAN:   1. I would again recommend an EEG to evaluate for epileptiform activity. '  2. Continue Tegretol at 800 mg twice daily. 3. Continue Zanaflex  10 mg three times a day. 4. Recommend to get Electrolytes, CBC, AST, ALT every 6 months; as well as one within the next week. 5. The use of Diastat 12.5 mg to be administered rectally for seizures lasting more than 3 minutes was discussed with the caregiver.     6. Seizure precautions were recommended to be maintained. The caregiver was instructed to notify our clinic if Drake Luque has any breakthrough seizures for an earlier appointment. 7. Anticipatory guidance and preventative counseling was provided regarding seizure precautions; and first aid measures during seizures was discussed in detail. 8. I plan to see Drake Luque back in  6 months or earlier if needed. Written by Radha Harrison RN acting as scribe for Dr. Dami Lyn. 7/29/2021  8:13 AM    I have reviewed and made changes accordingly to the work scribed by Radha Harrison RN. The documentation accurately reflects work and decisions made by me. Liat Branch MD   Pediatric Neurology & Epilepsy  7/29/2021        Vero Finn is a 54 y.o. male being evaluated in the presence of his caregiver by a video visit encounter for neurological concerns as above. Due to this being a TeleHealth encounter (During PSJAZ-41 public health emergency), evaluation of the following organ systems is limited: Vitals/Constitutional/EENT/Resp/CV/GI//MS/Neuro/Skin/Heme-Lymph-Imm. Patient and provider were located at home. Pursuant to the emergency declaration under the [facility], 1135 waiver authority and the [employer] ([employer] Act, this Virtual  Visit was conducted, with patient's consent, to reduce the patient's risk of exposure to COVID-19 and provide continuity of care for an established patient. Services were provided through a video synchronous discussion virtually to substitute for in-person clinic visit. --Donna Pinto MD on 7/29/2021 at 8:40 AM    An  electronic signature was used to authenticate this note.

## 2021-07-29 NOTE — PATIENT INSTRUCTIONS
PLAN:   1. I would again recommend an EEG to evaluate for epileptiform activity. '  2. Continue Tegretol at 800 mg twice daily. 3. Continue Zanaflex  10 mg three times a day. 4. Recommend to get Electrolytes, CBC, AST, ALT every 6 months; as well as one within the next week. 5. The use of Diastat 12.5 mg to be administered rectally for seizures lasting more than 3 minutes was discussed with the caregiver. 6. Seizure precautions were recommended to be maintained. The caregiver was instructed to notify our clinic if Lashell Schultz has any breakthrough seizures for an earlier appointment. 7. Anticipatory guidance and preventative counseling was provided regarding seizure precautions; and first aid measures during seizures was discussed in detail. 8. I plan to see Lashell Schultz back in  6 months or earlier if needed.

## 2021-08-11 ENCOUNTER — HOSPITAL ENCOUNTER (OUTPATIENT)
Age: 46
Setting detail: SPECIMEN
Discharge: HOME | End: 2021-08-11
Payer: MEDICARE

## 2021-08-11 LAB
ALT SERPL-CCNC: 20 U/L (ref 5–41)
ANION GAP SERPL CALCULATED.3IONS-SCNC: 9 MMOL/L (ref 9–17)
AST SERPL-CCNC: 23 U/L
CHLORIDE BLD-SCNC: 110 MMOL/L (ref 98–107)
CO2: 25 MMOL/L (ref 20–31)
HCT VFR BLD CALC: 47.3 % (ref 40.7–50.3)
HEMOGLOBIN: 14.2 G/DL (ref 13–17)
MCH RBC QN AUTO: 29.5 PG (ref 25.2–33.5)
MCHC RBC AUTO-ENTMCNC: 30 G/DL (ref 28.4–34.8)
MCV RBC AUTO: 98.3 FL (ref 82.6–102.9)
NRBC AUTOMATED: 0 PER 100 WBC
PDW BLD-RTO: 11.9 % (ref 11.8–14.4)
PLATELET # BLD: 218 K/UL (ref 138–453)
PMV BLD AUTO: 10.8 FL (ref 8.1–13.5)
POTASSIUM SERPL-SCNC: 4.8 MMOL/L (ref 3.7–5.3)
RBC # BLD: 4.81 M/UL (ref 4.21–5.77)
SODIUM BLD-SCNC: 144 MMOL/L (ref 135–144)
WBC # BLD: 5.8 K/UL (ref 3.5–11.3)

## 2021-08-11 PROCEDURE — P9603 ONE-WAY ALLOW PRORATED MILES: HCPCS

## 2021-08-11 PROCEDURE — 80051 ELECTROLYTE PANEL: CPT

## 2021-08-11 PROCEDURE — 84450 TRANSFERASE (AST) (SGOT): CPT

## 2021-08-11 PROCEDURE — 85027 COMPLETE CBC AUTOMATED: CPT

## 2021-08-11 PROCEDURE — 36415 COLL VENOUS BLD VENIPUNCTURE: CPT

## 2021-08-11 PROCEDURE — 84460 ALANINE AMINO (ALT) (SGPT): CPT

## 2021-09-08 ENCOUNTER — OFFICE VISIT (OUTPATIENT)
Dept: PEDIATRIC NEUROLOGY | Age: 46
End: 2021-09-08
Payer: MEDICARE

## 2021-09-08 DIAGNOSIS — G40.909 NONINTRACTABLE EPILEPSY WITHOUT STATUS EPILEPTICUS, UNSPECIFIED EPILEPSY TYPE (HCC): Primary | ICD-10-CM

## 2021-09-08 PROCEDURE — 95816 EEG AWAKE AND DROWSY: CPT | Performed by: PSYCHIATRY & NEUROLOGY

## 2021-09-08 NOTE — PROGRESS NOTES
33 minute eeg only. Extremely difficult as patient pulled off leads multiple times and was struggling with aide the entire time.

## 2021-09-09 ENCOUNTER — TELEPHONE (OUTPATIENT)
Dept: PEDIATRIC NEUROLOGY | Age: 46
End: 2021-09-09

## 2021-09-09 NOTE — RESULT ENCOUNTER NOTE
The EEG is abnormal.  Diffuse slowing . Child will need to continue AED MEDICATIONS. Please let parents know.

## 2021-09-09 NOTE — TELEPHONE ENCOUNTER
Nurse, Go Candelario was notified EEG is abnormal due to Diffuse slowing. Patient will need to continue his current plan and medications.  Layne verbalized understanding and had no questions at this time

## 2021-09-22 ENCOUNTER — HOSPITAL ENCOUNTER (OUTPATIENT)
Age: 46
Setting detail: SPECIMEN
Discharge: HOME | End: 2021-09-22
Payer: MEDICARE

## 2021-09-22 LAB
ABSOLUTE EOS #: 0.17 K/UL (ref 0–0.44)
ABSOLUTE IMMATURE GRANULOCYTE: <0.03 K/UL (ref 0–0.3)
ABSOLUTE LYMPH #: 1.6 K/UL (ref 1.1–3.7)
ABSOLUTE MONO #: 0.38 K/UL (ref 0.1–1.2)
ALBUMIN SERPL-MCNC: 3.9 G/DL (ref 3.5–5.2)
ALBUMIN/GLOBULIN RATIO: 1.5 (ref 1–2.5)
ALP BLD-CCNC: 115 U/L (ref 40–129)
ALT SERPL-CCNC: 32 U/L (ref 5–41)
ANION GAP SERPL CALCULATED.3IONS-SCNC: 9 MMOL/L (ref 9–17)
AST SERPL-CCNC: 23 U/L
BASOPHILS # BLD: 1 % (ref 0–2)
BASOPHILS ABSOLUTE: 0.04 K/UL (ref 0–0.2)
BILIRUB SERPL-MCNC: 0.25 MG/DL (ref 0.3–1.2)
BUN BLDV-MCNC: 15 MG/DL (ref 6–20)
BUN/CREAT BLD: ABNORMAL (ref 9–20)
CALCIUM SERPL-MCNC: 8.7 MG/DL (ref 8.6–10.4)
CHLORIDE BLD-SCNC: 108 MMOL/L (ref 98–107)
CO2: 26 MMOL/L (ref 20–31)
CREAT SERPL-MCNC: 0.65 MG/DL (ref 0.7–1.2)
DIFFERENTIAL TYPE: NORMAL
EOSINOPHILS RELATIVE PERCENT: 4 % (ref 1–4)
GFR AFRICAN AMERICAN: >60 ML/MIN
GFR NON-AFRICAN AMERICAN: >60 ML/MIN
GFR SERPL CREATININE-BSD FRML MDRD: ABNORMAL ML/MIN/{1.73_M2}
GFR SERPL CREATININE-BSD FRML MDRD: ABNORMAL ML/MIN/{1.73_M2}
GLUCOSE BLD-MCNC: 76 MG/DL (ref 70–99)
HCT VFR BLD CALC: 43 % (ref 40.7–50.3)
HEMOGLOBIN: 13.5 G/DL (ref 13–17)
IMMATURE GRANULOCYTES: 0 %
LYMPHOCYTES # BLD: 35 % (ref 24–43)
MCH RBC QN AUTO: 29.6 PG (ref 25.2–33.5)
MCHC RBC AUTO-ENTMCNC: 31.4 G/DL (ref 28.4–34.8)
MCV RBC AUTO: 94.3 FL (ref 82.6–102.9)
MONOCYTES # BLD: 8 % (ref 3–12)
NRBC AUTOMATED: 0 PER 100 WBC
PDW BLD-RTO: 12.5 % (ref 11.8–14.4)
PLATELET # BLD: 195 K/UL (ref 138–453)
PLATELET ESTIMATE: NORMAL
PMV BLD AUTO: 11.7 FL (ref 8.1–13.5)
POTASSIUM SERPL-SCNC: 3.8 MMOL/L (ref 3.7–5.3)
RBC # BLD: 4.56 M/UL (ref 4.21–5.77)
RBC # BLD: NORMAL 10*6/UL
SEG NEUTROPHILS: 52 % (ref 36–65)
SEGMENTED NEUTROPHILS ABSOLUTE COUNT: 2.32 K/UL (ref 1.5–8.1)
SODIUM BLD-SCNC: 143 MMOL/L (ref 135–144)
TOTAL PROTEIN: 6.5 G/DL (ref 6.4–8.3)
TSH SERPL DL<=0.05 MIU/L-ACNC: 2.29 MIU/L (ref 0.3–5)
WBC # BLD: 4.5 K/UL (ref 3.5–11.3)
WBC # BLD: NORMAL 10*3/UL

## 2021-09-22 PROCEDURE — 80053 COMPREHEN METABOLIC PANEL: CPT

## 2021-09-22 PROCEDURE — 85025 COMPLETE CBC W/AUTO DIFF WBC: CPT

## 2021-09-22 PROCEDURE — P9603 ONE-WAY ALLOW PRORATED MILES: HCPCS

## 2021-09-22 PROCEDURE — 36415 COLL VENOUS BLD VENIPUNCTURE: CPT

## 2021-09-22 PROCEDURE — 84443 ASSAY THYROID STIM HORMONE: CPT

## 2021-12-22 ENCOUNTER — TELEMEDICINE (OUTPATIENT)
Dept: PEDIATRIC NEUROLOGY | Age: 46
End: 2021-12-22
Payer: MEDICARE

## 2021-12-22 DIAGNOSIS — G40.B09 NONINTRACTABLE JUVENILE MYOCLONIC EPILEPSY WITHOUT STATUS EPILEPTICUS (HCC): Chronic | ICD-10-CM

## 2021-12-22 DIAGNOSIS — G82.50 SPASTIC QUADRIPLEGIA (HCC): Primary | Chronic | ICD-10-CM

## 2021-12-22 DIAGNOSIS — G93.49 STATIC ENCEPHALOPATHY: ICD-10-CM

## 2021-12-22 PROCEDURE — 99214 OFFICE O/P EST MOD 30 MIN: CPT | Performed by: PSYCHIATRY & NEUROLOGY

## 2021-12-22 PROCEDURE — G8427 DOCREV CUR MEDS BY ELIG CLIN: HCPCS | Performed by: PSYCHIATRY & NEUROLOGY

## 2021-12-22 NOTE — PROGRESS NOTES
SUBJECTIVE:   It was a pleasure to see Dottie Keller  in the Pediatric Neurology Clinic at [facility]. He is a 51 y.o. male accompanied by his caregiver Jose Eduardo Burger to this visit for a follow-up neurological evaluation. INTERIM PROGRESS:  EPILEPSY:   Veronika Domínguez, caregiver, denies witnessing any breakthrough seizures since the last visit. His last witnessed seizure occurred in 1994. His last EEG was completed 9/8/21 which was abnormal,  limited EEG due to the mild amount of diffuse slowing in the theta range seen during the study.  These waveforms are not considered epileptiform in nature.  This constellation of findings indicates a moderate degree of cerebral dysfunction and suggestive of mild to moderate diffuse neuronal dysfunction.  On some occasions, electrode and movement artefact was noted in the F3, C3, and C4 channels, which limited the interpretation at times. His seizure description in the past was reported as grand mal seizures. He continues to take Trileptal in this regard, without any reports of side effects or concerns. CEREBRAL PALSY:  Veronika Domínguez reports that he continues to exhibit spasticity. He is wheel chair bound. Spasticity is more in the lower extremities, he is unable to stand unassisted and uses a stander. Aram Kemar states that he will be going for a PEG tube placement in the future to supplement his liquid intake. Irven Drilling continues to require assistance with his feedings and ADL's. Irven Drilling continues to experience restricted range of motion at the knee. He continues to receive physical and occupational therapies at [facility]. He continues to take Zanaflex in this regard with no reported side effects or concerns. Previous Medications Tried: Baclofen (ineffective)    REVIEW OF SYSTEMS:  Constitutional: Negative. Eyes: Legally blind. Respiratory: Negative. Cardiovascular: Negative. Gastrointestinal: Negative. Genitourinary: Negative.    Musculoskeletal: Positive for spastic Quadriparesis. Skin: Negative. Neurological: Negative for headaches, positive for seizures, positive for developmental delays. Hematological: Negative. Psychiatric/Behavioral: Negative for behavioral issues, Negative for ADHD     All other systems reviewed and are negative. Past, social, family, and developmental history was reviewed and unchanged. OBJECTIVE:   PHYSICAL EXAM:    Neurological: he is alert. Mirtha Coker sat in his wheelchair comfortably. He continued to exhibits some lip smacking movements and smiled at the examiners spontaneously during the visit. The tone was noted to be increased in the extremities. He made unintelligible sounds and shouted sometimes during the visit. Mild amount of spasticity was noted all over in the upper extremities with no restriction of range of motion. He was able to move his hands above his shoulder level. He was able to lift his lower extremities in the air, but there was significant spasticity noted in the hamstrings which made it difficult to extend the lower extremity. On a baseline he is totally dependent on caregiver and is moved with the help of a lift. Unchanged exam     Reflex Scores: 3+ diffuse. No focal weakness noted on exam.      Constitutional: [x] Appears well-developed and well-nourished [x] No apparent distress      [] Abnormal-   Mental status  [x] Alert and awake  [] Oriented to person/place/time [x]Unable to follow commands at baseline. Eyes:  EOM    [x]  Normal  [] Abnormal-  Sclera  [x]  Normal  [] Abnormal -         Discharge [x]  None visible  [] Abnormal -    HENT:   [x] Normocephalic, atraumatic.   [] Abnormal   [x] Mouth/Throat: Mucous membranes are moist.     External Ears [x] Normal  [] Abnormal-     Neck: [x] No visualized mass     Pulmonary/Chest: [x] Respiratory effort normal.  [x] No visualized signs of difficulty breathing or respiratory distress        [] Abnormal-      Musculoskeletal:   [] Normal gait with no signs of ataxia         [x] Normal range of motion of neck        [x] Abnormal-  As above    Neurological:        [x] No Facial Asymmetry (Cranial nerve 7 motor function) (limited exam to video visit)          [x] No gaze palsy        [] Abnormal-         Skin:        [x] No significant exanthematous lesions or discoloration noted on facial skin         [] Abnormal-            Psychiatric:       [] Normal Affect [] No Hallucinations        [x] Abnormal- As above      RECORD REVIEW: Previous medical records were reviewed at today's visit. DIAGNOSTIC STUDIES:  11/15/2015 - EEG - This is an abnormal EEG due to generalized slowing as well as epileptiform discharges.  The generalized slowing is suggestive of diffuse neuronal dysfunction.  In addition, there  were focal discharges seen during the study as mentioned above. These waveforms are considered to be epileptiform in nature.  This constellation of findings is consistent with a partial epileptogenic  abnormality and an increased risk of seizures in the future. 12/10/2015 - MRI Brain - Marked white matter volume loss throughout both cerebral hemispheres with marked thinning of the corpus callosum and enlarged lateral ventricles with undulating borders and mild increased signal intensity on FLAIR sequence in the periventricular white matter. 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 Head of the mandibular condyles are markedly enlarged bilaterally. No associated edema or joint effusions evident. Visualized muscles of mastication do not appear enlarged. This is of indeterminate etiology. Correlate for any symptoms referrable to this area. Small amount of fluid right mastoid air cells. Correlate for signs or symptoms of infection.   03/01/2018 - EEG - Abnormal due to generalized slowing as well as epileptiform discharges.  The generalized slowing is suggestive of diffuse neuronal dysfunction.  In addition, there were occasional sharp Anne Talbert is a 51 y.o. male with:  1. Epilepsy with the last reported seizure occurring in May 1994.   2. Choreoathetoid cerebral palsy   3. Static Encephalopathy  4. Severe Spastic Quadriparesis  5. Spasticity, which continues to remain a concern. PLAN:   1. Continue Tegretol at 800 mg twice daily. 2. Continue Zanaflex  10 mg three times a day. 3. Recommend to get Electrolytes, CBC, AST, ALT every 6 months. 4. The use of Diastat 12.5 mg to be administered rectally for seizures lasting more than 3 minutes was discussed with the caregiver. 5. Seizure precautions were recommended to be maintained. The caregiver was instructed to notify our clinic if King Marvin has any breakthrough seizures for an earlier appointment. 6. Anticipatory guidance and preventative counseling was provided regarding seizure precautions; and first aid measures during seizures was discussed in detail. 7. I plan to see King Marvin back in 5 months or earlier if needed. Written by Brianna Hoffmann RN acting as scribe for Dr. Tad Ellis. 12/22/2021  8:02 AM    I have reviewed and made changes accordingly to the work scribed by Brianna Hoffmann RN. The documentation accurately reflects work and decisions made by me. Karie Mirza MD   Pediatric Neurology & Epilepsy  12/22/2021          Anne Talbert is a 51 y.o. male being evaluated in the presence of his caregiver by a video visit encounter for neurological concerns as above. Due to this being a TeleHealth encounter (During [facility]- public health emergency), evaluation of the following organ systems is limited: Vitals/Constitutional/EENT/Resp/CV/GI//MS/Neuro/Skin/Heme-Lymph-Imm. Patient and provider were located at home.   Pursuant to the emergency declaration under the [facility], 1135 waiver authority and the [employer] Act, this Virtual  Visit was conducted, with patient's consent, to reduce the patient's risk of exposure to COVID-19 and provide continuity of care for an established patient. Services were provided through a video synchronous discussion virtually to substitute for in-person clinic visit. --Lissette Velazquez MD on 12/22/2021 at 8:21 AM    An  electronic signature was used to authenticate this note.

## 2021-12-28 ENCOUNTER — HOSPITAL ENCOUNTER (OUTPATIENT)
Age: 46
Setting detail: SPECIMEN
Discharge: HOME | End: 2021-12-28
Payer: MEDICARE

## 2021-12-28 LAB
ALT SERPL-CCNC: 18 U/L (ref 5–41)
ANION GAP SERPL CALCULATED.3IONS-SCNC: 12 MMOL/L (ref 9–17)
AST SERPL-CCNC: 20 U/L
CHLORIDE BLD-SCNC: 107 MMOL/L (ref 98–107)
CO2: 24 MMOL/L (ref 20–31)
HCT VFR BLD CALC: 46.7 % (ref 40.7–50.3)
HEMOGLOBIN: 14.5 G/DL (ref 13–17)
MCH RBC QN AUTO: 29.4 PG (ref 25.2–33.5)
MCHC RBC AUTO-ENTMCNC: 31 G/DL (ref 28.4–34.8)
MCV RBC AUTO: 94.7 FL (ref 82.6–102.9)
NRBC AUTOMATED: 0 PER 100 WBC
PDW BLD-RTO: 12.5 % (ref 11.8–14.4)
PLATELET # BLD: 241 K/UL (ref 138–453)
PMV BLD AUTO: 10.5 FL (ref 8.1–13.5)
POTASSIUM SERPL-SCNC: 4.1 MMOL/L (ref 3.7–5.3)
RBC # BLD: 4.93 M/UL (ref 4.21–5.77)
SODIUM BLD-SCNC: 143 MMOL/L (ref 135–144)
WBC # BLD: 8.7 K/UL (ref 3.5–11.3)

## 2021-12-28 PROCEDURE — 85027 COMPLETE CBC AUTOMATED: CPT

## 2021-12-28 PROCEDURE — 80051 ELECTROLYTE PANEL: CPT

## 2021-12-28 PROCEDURE — 36415 COLL VENOUS BLD VENIPUNCTURE: CPT

## 2021-12-28 PROCEDURE — P9603 ONE-WAY ALLOW PRORATED MILES: HCPCS

## 2021-12-28 PROCEDURE — 84450 TRANSFERASE (AST) (SGOT): CPT

## 2021-12-28 PROCEDURE — 84460 ALANINE AMINO (ALT) (SGPT): CPT

## 2022-01-14 ENCOUNTER — HOSPITAL ENCOUNTER (OUTPATIENT)
Age: 47
Setting detail: SPECIMEN
Discharge: HOME | End: 2022-01-14
Payer: MEDICARE

## 2022-01-14 LAB
CARBAMAZEPINE DATE LAST DOSE: ABNORMAL
CARBAMAZEPINE DOSE AMOUNT: ABNORMAL
CARBAMAZEPINE DOSE TIME: ABNORMAL
CARBAMAZEPINE LEVEL: 12.5 UG/ML (ref 4–12)

## 2022-01-14 PROCEDURE — P9603 ONE-WAY ALLOW PRORATED MILES: HCPCS

## 2022-01-14 PROCEDURE — 36415 COLL VENOUS BLD VENIPUNCTURE: CPT

## 2022-01-14 PROCEDURE — 80156 ASSAY CARBAMAZEPINE TOTAL: CPT

## 2022-02-17 ENCOUNTER — HOSPITAL ENCOUNTER (OUTPATIENT)
Age: 47
Setting detail: SPECIMEN
Discharge: HOME | End: 2022-02-17
Payer: MEDICARE

## 2022-02-17 LAB
ALT SERPL-CCNC: 25 U/L (ref 5–41)
ANION GAP SERPL CALCULATED.3IONS-SCNC: 15 MMOL/L (ref 9–17)
AST SERPL-CCNC: 23 U/L
CHLORIDE BLD-SCNC: 105 MMOL/L (ref 98–107)
CO2: 23 MMOL/L (ref 20–31)
HCT VFR BLD CALC: 48.2 % (ref 40.7–50.3)
HEMOGLOBIN: 15.1 G/DL (ref 13–17)
MCH RBC QN AUTO: 30.6 PG (ref 25.2–33.5)
MCHC RBC AUTO-ENTMCNC: 31.3 G/DL (ref 28.4–34.8)
MCV RBC AUTO: 97.8 FL (ref 82.6–102.9)
NRBC AUTOMATED: 0 PER 100 WBC
PDW BLD-RTO: 12.7 % (ref 11.8–14.4)
PLATELET # BLD: 261 K/UL (ref 138–453)
PMV BLD AUTO: 10.8 FL (ref 8.1–13.5)
POTASSIUM SERPL-SCNC: 5.3 MMOL/L (ref 3.7–5.3)
RBC # BLD: 4.93 M/UL (ref 4.21–5.77)
SODIUM BLD-SCNC: 143 MMOL/L (ref 135–144)
WBC # BLD: 4.8 K/UL (ref 3.5–11.3)

## 2022-02-17 PROCEDURE — 85027 COMPLETE CBC AUTOMATED: CPT

## 2022-02-17 PROCEDURE — 80051 ELECTROLYTE PANEL: CPT

## 2022-02-17 PROCEDURE — 84450 TRANSFERASE (AST) (SGOT): CPT

## 2022-02-17 PROCEDURE — 84460 ALANINE AMINO (ALT) (SGPT): CPT

## 2022-02-17 PROCEDURE — P9603 ONE-WAY ALLOW PRORATED MILES: HCPCS

## 2022-02-17 PROCEDURE — 36415 COLL VENOUS BLD VENIPUNCTURE: CPT

## 2022-02-18 ENCOUNTER — TELEPHONE (OUTPATIENT)
Dept: PEDIATRIC NEUROLOGY | Age: 47
End: 2022-02-18

## 2022-02-18 NOTE — TELEPHONE ENCOUNTER
Attempted to contact Cele Manzo, Nurse at the home to give lab results; however, no answer. LVM requesting a return call.

## 2022-02-18 NOTE — TELEPHONE ENCOUNTER
----- Message from PITA Noland CNP sent at 2/17/2022  5:03 PM EST -----  Labs within normal limits.   Notify parents

## 2022-02-21 ENCOUNTER — TELEPHONE (OUTPATIENT)
Dept: PEDIATRIC NEUROLOGY | Age: 47
End: 2022-02-21

## 2022-02-21 NOTE — TELEPHONE ENCOUNTER
----- Message from PITA Solorio CNP sent at 2/17/2022  5:03 PM EST -----  Labs within normal limits.   Notify parents

## 2022-02-21 NOTE — TELEPHONE ENCOUNTER
Brian Pugh, nurse at [facility] was notified of labs within normal limits and she verbalized understanding.

## 2022-06-21 ENCOUNTER — HOSPITAL ENCOUNTER (OUTPATIENT)
Age: 47
Setting detail: SPECIMEN
Discharge: HOME | End: 2022-06-21
Payer: MEDICARE

## 2022-06-21 LAB
ALT SERPL-CCNC: 18 U/L (ref 5–41)
ANION GAP SERPL CALCULATED.3IONS-SCNC: 14 MMOL/L (ref 9–17)
AST SERPL-CCNC: 23 U/L
CHLORIDE BLD-SCNC: 107 MMOL/L (ref 98–107)
CO2: 23 MMOL/L (ref 20–31)
HCT VFR BLD CALC: 46.2 % (ref 40.7–50.3)
HEMOGLOBIN: 14.2 G/DL (ref 13–17)
MCH RBC QN AUTO: 29 PG (ref 25.2–33.5)
MCHC RBC AUTO-ENTMCNC: 30.7 G/DL (ref 28.4–34.8)
MCV RBC AUTO: 94.3 FL (ref 82.6–102.9)
NRBC AUTOMATED: 0 PER 100 WBC
PDW BLD-RTO: 13.1 % (ref 11.8–14.4)
PLATELET # BLD: 233 K/UL (ref 138–453)
PMV BLD AUTO: 11 FL (ref 8.1–13.5)
POTASSIUM SERPL-SCNC: 3.7 MMOL/L (ref 3.7–5.3)
RBC # BLD: 4.9 M/UL (ref 4.21–5.77)
SODIUM BLD-SCNC: 144 MMOL/L (ref 135–144)
WBC # BLD: 5.8 K/UL (ref 3.5–11.3)

## 2022-06-21 PROCEDURE — 84460 ALANINE AMINO (ALT) (SGPT): CPT

## 2022-06-21 PROCEDURE — 36415 COLL VENOUS BLD VENIPUNCTURE: CPT

## 2022-06-21 PROCEDURE — 80051 ELECTROLYTE PANEL: CPT

## 2022-06-21 PROCEDURE — 84450 TRANSFERASE (AST) (SGOT): CPT

## 2022-06-21 PROCEDURE — P9603 ONE-WAY ALLOW PRORATED MILES: HCPCS

## 2022-06-21 PROCEDURE — 85027 COMPLETE CBC AUTOMATED: CPT

## 2022-08-29 ENCOUNTER — HOSPITAL ENCOUNTER (OUTPATIENT)
Age: 47
Setting detail: SPECIMEN
Discharge: HOME | End: 2022-08-29
Payer: MEDICARE

## 2022-08-29 LAB
CHOLESTEROL/HDL RATIO: 3.6
CHOLESTEROL: 164 MG/DL
HDLC SERPL-MCNC: 45 MG/DL
HEPATITIS C ANTIBODY: NONREACTIVE
LDL CHOLESTEROL: 95 MG/DL (ref 0–130)
TRIGL SERPL-MCNC: 119 MG/DL

## 2022-08-29 PROCEDURE — 36415 COLL VENOUS BLD VENIPUNCTURE: CPT

## 2022-08-29 PROCEDURE — 86803 HEPATITIS C AB TEST: CPT

## 2022-08-29 PROCEDURE — 80061 LIPID PANEL: CPT

## 2022-08-29 PROCEDURE — P9603 ONE-WAY ALLOW PRORATED MILES: HCPCS

## 2022-10-20 ENCOUNTER — HOSPITAL ENCOUNTER (OUTPATIENT)
Age: 47
Setting detail: SPECIMEN
Discharge: HOME | End: 2022-10-20
Payer: MEDICARE

## 2022-10-20 LAB
CARBAMAZEPINE LEVEL: 10.7 UG/ML (ref 4–12)
CHOLESTEROL/HDL RATIO: 3.5
CHOLESTEROL: 158 MG/DL
HDLC SERPL-MCNC: 45 MG/DL
HEPATITIS C ANTIBODY: NONREACTIVE
LDL CHOLESTEROL: 96 MG/DL (ref 0–130)
TRIGL SERPL-MCNC: 86 MG/DL

## 2022-10-20 PROCEDURE — 80061 LIPID PANEL: CPT

## 2022-10-20 PROCEDURE — P9603 ONE-WAY ALLOW PRORATED MILES: HCPCS

## 2022-10-20 PROCEDURE — 36415 COLL VENOUS BLD VENIPUNCTURE: CPT

## 2022-10-20 PROCEDURE — 80156 ASSAY CARBAMAZEPINE TOTAL: CPT

## 2022-10-20 PROCEDURE — 86803 HEPATITIS C AB TEST: CPT

## 2022-12-07 ENCOUNTER — HOSPITAL ENCOUNTER (OUTPATIENT)
Age: 47
Setting detail: SPECIMEN
Discharge: HOME | End: 2022-12-07
Payer: MEDICARE

## 2022-12-07 LAB
ABSOLUTE EOS #: 0.13 K/UL (ref 0–0.44)
ABSOLUTE IMMATURE GRANULOCYTE: <0.03 K/UL (ref 0–0.3)
ABSOLUTE LYMPH #: 1.61 K/UL (ref 1.1–3.7)
ABSOLUTE MONO #: 0.5 K/UL (ref 0.1–1.2)
ALBUMIN SERPL-MCNC: 4.1 G/DL (ref 3.5–5.2)
ALBUMIN/GLOBULIN RATIO: 1.3 (ref 1–2.5)
ALP BLD-CCNC: 153 U/L (ref 40–129)
ALT SERPL-CCNC: 18 U/L (ref 5–41)
ANION GAP SERPL CALCULATED.3IONS-SCNC: 10 MMOL/L (ref 9–17)
AST SERPL-CCNC: 26 U/L
BASOPHILS # BLD: 1 % (ref 0–2)
BASOPHILS ABSOLUTE: 0.04 K/UL (ref 0–0.2)
BILIRUB SERPL-MCNC: 0.2 MG/DL (ref 0.3–1.2)
BUN BLDV-MCNC: 11 MG/DL (ref 6–20)
CALCIUM SERPL-MCNC: 9.1 MG/DL (ref 8.6–10.4)
CHLORIDE BLD-SCNC: 106 MMOL/L (ref 98–107)
CO2: 26 MMOL/L (ref 20–31)
CREAT SERPL-MCNC: 0.65 MG/DL (ref 0.7–1.2)
EOSINOPHILS RELATIVE PERCENT: 2 % (ref 1–4)
GFR SERPL CREATININE-BSD FRML MDRD: >60 ML/MIN/1.73M2
GLUCOSE BLD-MCNC: 75 MG/DL (ref 70–99)
HCT VFR BLD CALC: 44.6 % (ref 40.7–50.3)
HEMOGLOBIN: 13.8 G/DL (ref 13–17)
IMMATURE GRANULOCYTES: 0 %
LYMPHOCYTES # BLD: 29 % (ref 24–43)
MCH RBC QN AUTO: 29.8 PG (ref 25.2–33.5)
MCHC RBC AUTO-ENTMCNC: 30.9 G/DL (ref 28.4–34.8)
MCV RBC AUTO: 96.3 FL (ref 82.6–102.9)
MONOCYTES # BLD: 9 % (ref 3–12)
NRBC AUTOMATED: 0 PER 100 WBC
PDW BLD-RTO: 12.6 % (ref 11.8–14.4)
PLATELET # BLD: 223 K/UL (ref 138–453)
PMV BLD AUTO: 11.2 FL (ref 8.1–13.5)
POTASSIUM SERPL-SCNC: 4.8 MMOL/L (ref 3.7–5.3)
RBC # BLD: 4.63 M/UL (ref 4.21–5.77)
SEG NEUTROPHILS: 59 % (ref 36–65)
SEGMENTED NEUTROPHILS ABSOLUTE COUNT: 3.31 K/UL (ref 1.5–8.1)
SODIUM BLD-SCNC: 142 MMOL/L (ref 135–144)
TOTAL PROTEIN: 7.2 G/DL (ref 6.4–8.3)
TSH SERPL DL<=0.05 MIU/L-ACNC: 1.51 UIU/ML (ref 0.3–5)
WBC # BLD: 5.6 K/UL (ref 3.5–11.3)

## 2022-12-07 PROCEDURE — 36415 COLL VENOUS BLD VENIPUNCTURE: CPT

## 2022-12-07 PROCEDURE — P9603 ONE-WAY ALLOW PRORATED MILES: HCPCS

## 2022-12-07 PROCEDURE — 85025 COMPLETE CBC W/AUTO DIFF WBC: CPT

## 2022-12-07 PROCEDURE — 84443 ASSAY THYROID STIM HORMONE: CPT

## 2022-12-07 PROCEDURE — 80053 COMPREHEN METABOLIC PANEL: CPT

## 2022-12-20 ENCOUNTER — HOSPITAL ENCOUNTER (OUTPATIENT)
Age: 47
Setting detail: SPECIMEN
Discharge: HOME | End: 2022-12-20
Payer: MEDICARE

## 2022-12-20 LAB
ALT SERPL-CCNC: 35 U/L (ref 5–41)
ANION GAP SERPL CALCULATED.3IONS-SCNC: 9 MMOL/L (ref 9–17)
AST SERPL-CCNC: 27 U/L
CHLORIDE BLD-SCNC: 106 MMOL/L (ref 98–107)
CO2: 26 MMOL/L (ref 20–31)
HCT VFR BLD CALC: 41.9 % (ref 40.7–50.3)
HEMOGLOBIN: 13.1 G/DL (ref 13–17)
MCH RBC QN AUTO: 28.9 PG (ref 25.2–33.5)
MCHC RBC AUTO-ENTMCNC: 31.3 G/DL (ref 28.4–34.8)
MCV RBC AUTO: 92.5 FL (ref 82.6–102.9)
NRBC AUTOMATED: 0 PER 100 WBC
PDW BLD-RTO: 11.9 % (ref 11.8–14.4)
PLATELET # BLD: 255 K/UL (ref 138–453)
PMV BLD AUTO: 11.2 FL (ref 8.1–13.5)
POTASSIUM SERPL-SCNC: 4.3 MMOL/L (ref 3.7–5.3)
RBC # BLD: 4.53 M/UL (ref 4.21–5.77)
SODIUM BLD-SCNC: 141 MMOL/L (ref 135–144)
WBC # BLD: 5.2 K/UL (ref 3.5–11.3)

## 2022-12-20 PROCEDURE — P9603 ONE-WAY ALLOW PRORATED MILES: HCPCS

## 2022-12-20 PROCEDURE — 80051 ELECTROLYTE PANEL: CPT

## 2022-12-20 PROCEDURE — 85027 COMPLETE CBC AUTOMATED: CPT

## 2022-12-20 PROCEDURE — 36415 COLL VENOUS BLD VENIPUNCTURE: CPT

## 2022-12-20 PROCEDURE — 84460 ALANINE AMINO (ALT) (SGPT): CPT

## 2022-12-20 PROCEDURE — 84450 TRANSFERASE (AST) (SGOT): CPT

## 2023-07-19 ENCOUNTER — HOSPITAL ENCOUNTER (OUTPATIENT)
Age: 48
Setting detail: SPECIMEN
Discharge: HOME | End: 2023-07-19
Payer: MEDICARE

## 2023-07-19 LAB — CARBAMAZEPINE SERPL-MCNC: 9.6 UG/ML (ref 4–12)

## 2023-07-19 PROCEDURE — P9603 ONE-WAY ALLOW PRORATED MILES: HCPCS

## 2023-07-19 PROCEDURE — 36415 COLL VENOUS BLD VENIPUNCTURE: CPT

## 2023-07-19 PROCEDURE — 80156 ASSAY CARBAMAZEPINE TOTAL: CPT

## 2023-08-31 ENCOUNTER — HOSPITAL ENCOUNTER (OUTPATIENT)
Age: 48
Setting detail: SPECIMEN
Discharge: HOME | End: 2023-08-31
Payer: MEDICARE

## 2023-08-31 PROCEDURE — 83540 ASSAY OF IRON: CPT

## 2023-08-31 PROCEDURE — 82728 ASSAY OF FERRITIN: CPT

## 2023-08-31 PROCEDURE — P9603 ONE-WAY ALLOW PRORATED MILES: HCPCS

## 2023-08-31 PROCEDURE — 82607 VITAMIN B-12: CPT

## 2023-08-31 PROCEDURE — 83735 ASSAY OF MAGNESIUM: CPT

## 2023-08-31 PROCEDURE — 82306 VITAMIN D 25 HYDROXY: CPT

## 2023-08-31 PROCEDURE — 85027 COMPLETE CBC AUTOMATED: CPT

## 2023-08-31 PROCEDURE — 84450 TRANSFERASE (AST) (SGOT): CPT

## 2023-08-31 PROCEDURE — 36415 COLL VENOUS BLD VENIPUNCTURE: CPT

## 2023-08-31 PROCEDURE — 83550 IRON BINDING TEST: CPT

## 2023-08-31 PROCEDURE — 80053 COMPREHEN METABOLIC PANEL: CPT

## 2023-08-31 PROCEDURE — 80051 ELECTROLYTE PANEL: CPT

## 2023-08-31 PROCEDURE — 85025 COMPLETE CBC W/AUTO DIFF WBC: CPT

## 2023-08-31 PROCEDURE — 84443 ASSAY THYROID STIM HORMONE: CPT

## 2023-08-31 PROCEDURE — 84460 ALANINE AMINO (ALT) (SGPT): CPT

## 2024-01-24 ENCOUNTER — HOSPITAL ENCOUNTER (OUTPATIENT)
Age: 49
Setting detail: SPECIMEN
Discharge: HOME | End: 2024-01-24
Payer: MEDICARE

## 2024-01-24 LAB — CARBAMAZEPINE SERPL-MCNC: 12 UG/ML (ref 4–12)

## 2024-01-24 PROCEDURE — 36415 COLL VENOUS BLD VENIPUNCTURE: CPT

## 2024-01-24 PROCEDURE — P9603 ONE-WAY ALLOW PRORATED MILES: HCPCS

## 2024-01-24 PROCEDURE — 80156 ASSAY CARBAMAZEPINE TOTAL: CPT

## 2024-02-22 ENCOUNTER — HOSPITAL ENCOUNTER (EMERGENCY)
Age: 49
Discharge: HOME | End: 2024-02-22
Attending: EMERGENCY MEDICINE
Payer: MEDICARE

## 2024-02-22 ENCOUNTER — APPOINTMENT (OUTPATIENT)
Dept: CT IMAGING | Age: 49
End: 2024-02-22
Payer: MEDICARE

## 2024-02-22 VITALS
SYSTOLIC BLOOD PRESSURE: 105 MMHG | HEART RATE: 97 BPM | TEMPERATURE: 98 F | DIASTOLIC BLOOD PRESSURE: 67 MMHG | RESPIRATION RATE: 16 BRPM | OXYGEN SATURATION: 95 %

## 2024-02-22 DIAGNOSIS — R56.9 SEIZURE-LIKE ACTIVITY (HCC): Primary | ICD-10-CM

## 2024-02-22 LAB
ABSOLUTE BANDS: 0.3 K/UL (ref 0–1)
ALBUMIN SERPL-MCNC: 4.3 G/DL (ref 3.5–5.2)
ALP SERPL-CCNC: 115 U/L (ref 40–129)
ALT SERPL-CCNC: 23 U/L (ref 5–41)
ANION GAP SERPL CALCULATED.3IONS-SCNC: 13 MMOL/L (ref 9–17)
AST SERPL-CCNC: 28 U/L
BACTERIA URNS QL MICRO: ABNORMAL
BANDS: 3 % (ref 0–10)
BASOPHILS # BLD: 0 K/UL (ref 0–0.2)
BASOPHILS NFR BLD: 0 % (ref 0–2)
BILIRUB SERPL-MCNC: 0.3 MG/DL (ref 0.3–1.2)
BILIRUB UR QL STRIP: NEGATIVE
BUN SERPL-MCNC: 15 MG/DL (ref 6–20)
CALCIUM SERPL-MCNC: 9.2 MG/DL (ref 8.6–10.4)
CASTS #/AREA URNS LPF: ABNORMAL /LPF
CHLORIDE SERPL-SCNC: 100 MMOL/L (ref 98–107)
CLARITY UR: ABNORMAL
CO2 SERPL-SCNC: 27 MMOL/L (ref 20–31)
COLOR UR: YELLOW
CREAT SERPL-MCNC: 0.9 MG/DL (ref 0.7–1.2)
EKG ATRIAL RATE: 85 BPM
EKG P AXIS: 49 DEGREES
EKG P-R INTERVAL: 160 MS
EKG Q-T INTERVAL: 362 MS
EKG QRS DURATION: 82 MS
EKG QTC CALCULATION (BAZETT): 430 MS
EKG R AXIS: 155 DEGREES
EKG T AXIS: 53 DEGREES
EKG VENTRICULAR RATE: 85 BPM
EOSINOPHIL # BLD: 0 K/UL (ref 0–0.4)
EOSINOPHILS RELATIVE PERCENT: 0 % (ref 0–4)
EPI CELLS #/AREA URNS HPF: ABNORMAL /HPF
ERYTHROCYTE [DISTWIDTH] IN BLOOD BY AUTOMATED COUNT: 13.1 % (ref 11.5–14.9)
FLUAV RNA RESP QL NAA+PROBE: NOT DETECTED
FLUBV RNA RESP QL NAA+PROBE: NOT DETECTED
GFR SERPL CREATININE-BSD FRML MDRD: >60 ML/MIN/1.73M2
GLUCOSE SERPL-MCNC: 164 MG/DL (ref 70–99)
GLUCOSE UR STRIP-MCNC: NEGATIVE MG/DL
HCT VFR BLD AUTO: 42.1 % (ref 41–53)
HGB BLD-MCNC: 13.8 G/DL (ref 13.5–17.5)
HGB UR QL STRIP.AUTO: ABNORMAL
INR PPP: 1
KETONES UR STRIP-MCNC: NEGATIVE MG/DL
LEUKOCYTE ESTERASE UR QL STRIP: ABNORMAL
LIPASE SERPL-CCNC: 30 U/L (ref 13–60)
LYMPHOCYTES NFR BLD: 0.4 K/UL (ref 1–4.8)
LYMPHOCYTES RELATIVE PERCENT: 4 % (ref 24–44)
MAGNESIUM SERPL-MCNC: 2.5 MG/DL (ref 1.6–2.6)
MCH RBC QN AUTO: 29.5 PG (ref 26–34)
MCHC RBC AUTO-ENTMCNC: 32.8 G/DL (ref 31–37)
MCV RBC AUTO: 90.1 FL (ref 80–100)
MONOCYTES NFR BLD: 0.61 K/UL (ref 0.1–1.3)
MONOCYTES NFR BLD: 6 % (ref 1–7)
MORPHOLOGY: NORMAL
NEUTROPHILS NFR BLD: 87 % (ref 36–66)
NEUTS SEG NFR BLD: 8.79 K/UL (ref 1.3–9.1)
NITRITE UR QL STRIP: NEGATIVE
PH UR STRIP: >=9 [PH] (ref 5–8)
PLATELET # BLD AUTO: 303 K/UL (ref 150–450)
PMV BLD AUTO: 8.4 FL (ref 6–12)
POTASSIUM SERPL-SCNC: 4 MMOL/L (ref 3.7–5.3)
PROT SERPL-MCNC: 7.6 G/DL (ref 6.4–8.3)
PROT UR STRIP-MCNC: ABNORMAL MG/DL
PROTHROMBIN TIME: 13.8 SEC (ref 11.8–14.6)
RBC # BLD AUTO: 4.67 M/UL (ref 4.5–5.9)
RBC #/AREA URNS HPF: ABNORMAL /HPF
SARS-COV-2 RNA RESP QL NAA+PROBE: NOT DETECTED
SODIUM SERPL-SCNC: 140 MMOL/L (ref 135–144)
SOURCE: NORMAL
SP GR UR STRIP: 1.01 (ref 1–1.03)
SPECIMEN DESCRIPTION: NORMAL
TROPONIN I SERPL HS-MCNC: 11 NG/L (ref 0–22)
UROBILINOGEN UR STRIP-ACNC: NORMAL EU/DL (ref 0–1)
WBC #/AREA URNS HPF: ABNORMAL /HPF
WBC OTHER # BLD: 10.1 K/UL (ref 3.5–11)

## 2024-02-22 PROCEDURE — 96361 HYDRATE IV INFUSION ADD-ON: CPT

## 2024-02-22 PROCEDURE — 99284 EMERGENCY DEPT VISIT MOD MDM: CPT

## 2024-02-22 PROCEDURE — 93010 ELECTROCARDIOGRAM REPORT: CPT | Performed by: INTERNAL MEDICINE

## 2024-02-22 PROCEDURE — 6360000002 HC RX W HCPCS: Performed by: EMERGENCY MEDICINE

## 2024-02-22 PROCEDURE — 85025 COMPLETE CBC W/AUTO DIFF WBC: CPT

## 2024-02-22 PROCEDURE — 84484 ASSAY OF TROPONIN QUANT: CPT

## 2024-02-22 PROCEDURE — 81001 URINALYSIS AUTO W/SCOPE: CPT

## 2024-02-22 PROCEDURE — 80156 ASSAY CARBAMAZEPINE TOTAL: CPT

## 2024-02-22 PROCEDURE — 85610 PROTHROMBIN TIME: CPT

## 2024-02-22 PROCEDURE — 70450 CT HEAD/BRAIN W/O DYE: CPT

## 2024-02-22 PROCEDURE — 2580000003 HC RX 258: Performed by: EMERGENCY MEDICINE

## 2024-02-22 PROCEDURE — 83735 ASSAY OF MAGNESIUM: CPT

## 2024-02-22 PROCEDURE — 93005 ELECTROCARDIOGRAM TRACING: CPT | Performed by: EMERGENCY MEDICINE

## 2024-02-22 PROCEDURE — 87636 SARSCOV2 & INF A&B AMP PRB: CPT

## 2024-02-22 PROCEDURE — 80053 COMPREHEN METABOLIC PANEL: CPT

## 2024-02-22 PROCEDURE — 83690 ASSAY OF LIPASE: CPT

## 2024-02-22 PROCEDURE — 36415 COLL VENOUS BLD VENIPUNCTURE: CPT

## 2024-02-22 PROCEDURE — 80157 ASSAY CARBAMAZEPINE FREE: CPT

## 2024-02-22 PROCEDURE — 96374 THER/PROPH/DIAG INJ IV PUSH: CPT

## 2024-02-22 PROCEDURE — 87086 URINE CULTURE/COLONY COUNT: CPT

## 2024-02-22 RX ORDER — MIDAZOLAM HYDROCHLORIDE 1 MG/ML
0.5 INJECTION INTRAMUSCULAR; INTRAVENOUS ONCE
Status: DISCONTINUED | OUTPATIENT
Start: 2024-02-22 | End: 2024-02-22 | Stop reason: HOSPADM

## 2024-02-22 RX ORDER — 0.9 % SODIUM CHLORIDE 0.9 %
1000 INTRAVENOUS SOLUTION INTRAVENOUS ONCE
Status: COMPLETED | OUTPATIENT
Start: 2024-02-22 | End: 2024-02-22

## 2024-02-22 RX ORDER — ONDANSETRON 2 MG/ML
4 INJECTION INTRAMUSCULAR; INTRAVENOUS ONCE
Status: COMPLETED | OUTPATIENT
Start: 2024-02-22 | End: 2024-02-22

## 2024-02-22 RX ADMIN — ONDANSETRON 4 MG: 2 INJECTION INTRAMUSCULAR; INTRAVENOUS at 10:35

## 2024-02-22 RX ADMIN — SODIUM CHLORIDE 1000 ML: 9 INJECTION, SOLUTION INTRAVENOUS at 07:30

## 2024-02-22 NOTE — ED PROVIDER NOTES
depression nonspecific T wave changes        3)  Treatment and Disposition         Will check labs, CT    Labs are reviewed and unremarkable, CT negative for acute abnormality    Patient was observed in ED for period of time no repeat seizure like activity    Staff reported the patient otherwise at his baseline    Given that he had no repeat seizure activity no significant findings on labs and imaging stable for discharge home      Patient/Guardian was informed of their diagnosis and told to follow up with PCP  in 1-3 days. Patient demonstrates understanding and agreement with the plan. They were given the opportunity to ask questions and those questions were answered to the best of our ability with the available information. Patient/Guardian told to return to the ED for any new, worsening, changing or persistent symptoms.       This dictation was prepared using Dragon Medical voice recognition software.       CRITICAL CARE:       PROCEDURES:  Procedures      DATA FOR LAB AND RADIOLOGY TESTS ORDERED BELOW ARE REVIEWED BY THE ED CLINICIAN:    RADIOLOGY: All x-rays, CT, MRI, and formal ultrasound images (except ED bedside ultrasound) are read by the radiologist, see reports below, unless otherwise noted in MDM or here.  Reports below are reviewed by myself.  CT Head W/O Contrast   Final Result   No acute intracranial abnormality.      Redemonstration of ventricular dilatation raising possibility of normal   pressure hydrocephalus.             LABS: Lab orders shown below, the results are reviewed by myself, and all abnormals are listed below.  Labs Reviewed   CBC WITH AUTO DIFFERENTIAL - Abnormal; Notable for the following components:       Result Value    Neutrophils % 87 (*)     Lymphocytes % 4 (*)     Lymphocytes Absolute 0.40 (*)     All other components within normal limits   COMPREHENSIVE METABOLIC PANEL - Abnormal; Notable for the following components:    Glucose 164 (*)     All other components within normal

## 2024-02-23 LAB
MICROORGANISM SPEC CULT: NO GROWTH
SPECIMEN DESCRIPTION: NORMAL

## 2024-02-25 LAB
% FREE CARBAMAZEPINE: 20.2 % (ref 8–35)
CARBAMAZEPINE, FREE: 2.1 UG/ML (ref 1–3)
CARBAMAZEPINE, TOTAL: 10.4 UG/ML (ref 4–12)

## 2024-02-28 ENCOUNTER — HOSPITAL ENCOUNTER (OUTPATIENT)
Age: 49
Setting detail: SPECIMEN
Discharge: HOME | End: 2024-02-28
Payer: MEDICARE

## 2024-02-28 LAB
ALT SERPL-CCNC: 25 U/L (ref 5–41)
ANION GAP SERPL CALCULATED.3IONS-SCNC: 11 MMOL/L (ref 9–17)
AST SERPL-CCNC: 25 U/L
CHLORIDE SERPL-SCNC: 105 MMOL/L (ref 98–107)
CO2 SERPL-SCNC: 26 MMOL/L (ref 20–31)
ERYTHROCYTE [DISTWIDTH] IN BLOOD BY AUTOMATED COUNT: 12.4 % (ref 11.8–14.4)
HCT VFR BLD AUTO: 45.2 % (ref 40.7–50.3)
HGB BLD-MCNC: 14.5 G/DL (ref 13–17)
MCH RBC QN AUTO: 29.8 PG (ref 25.2–33.5)
MCHC RBC AUTO-ENTMCNC: 32.1 G/DL (ref 28.4–34.8)
MCV RBC AUTO: 92.8 FL (ref 82.6–102.9)
NRBC BLD-RTO: 0 PER 100 WBC
PLATELET # BLD AUTO: 286 K/UL (ref 138–453)
PMV BLD AUTO: 10.3 FL (ref 8.1–13.5)
POTASSIUM SERPL-SCNC: 3.9 MMOL/L (ref 3.7–5.3)
RBC # BLD AUTO: 4.87 M/UL (ref 4.21–5.77)
SODIUM SERPL-SCNC: 142 MMOL/L (ref 135–144)
WBC OTHER # BLD: 11.4 K/UL (ref 3.5–11.3)

## 2024-02-28 PROCEDURE — 36415 COLL VENOUS BLD VENIPUNCTURE: CPT

## 2024-02-28 PROCEDURE — 85027 COMPLETE CBC AUTOMATED: CPT

## 2024-02-28 PROCEDURE — 84450 TRANSFERASE (AST) (SGOT): CPT

## 2024-02-28 PROCEDURE — P9603 ONE-WAY ALLOW PRORATED MILES: HCPCS

## 2024-02-28 PROCEDURE — 84460 ALANINE AMINO (ALT) (SGPT): CPT

## 2024-02-28 PROCEDURE — 80051 ELECTROLYTE PANEL: CPT

## 2024-06-17 ENCOUNTER — HOSPITAL ENCOUNTER (OUTPATIENT)
Age: 49
Setting detail: SPECIMEN
Discharge: HOME | End: 2024-06-17

## 2024-06-17 LAB
ALT SERPL-CCNC: 24 U/L (ref 10–50)
ANION GAP SERPL CALCULATED.3IONS-SCNC: 10 MMOL/L (ref 9–16)
AST SERPL-CCNC: 30 U/L (ref 10–50)
CARBAMAZEPINE SERPL-MCNC: 12 UG/ML (ref 4–12)
CHLORIDE SERPL-SCNC: 103 MMOL/L (ref 98–107)
CO2 SERPL-SCNC: 27 MMOL/L (ref 20–31)
ERYTHROCYTE [DISTWIDTH] IN BLOOD BY AUTOMATED COUNT: 12.7 % (ref 11.8–14.4)
HCT VFR BLD AUTO: 42.1 % (ref 40.7–50.3)
HGB BLD-MCNC: 13.5 G/DL (ref 13–17)
MCH RBC QN AUTO: 29.5 PG (ref 25.2–33.5)
MCHC RBC AUTO-ENTMCNC: 32.1 G/DL (ref 28.4–34.8)
MCV RBC AUTO: 91.9 FL (ref 82.6–102.9)
NRBC BLD-RTO: 0 PER 100 WBC
PLATELET # BLD AUTO: 254 K/UL (ref 138–453)
PMV BLD AUTO: 11.1 FL (ref 8.1–13.5)
POTASSIUM SERPL-SCNC: 3.8 MMOL/L (ref 3.7–5.3)
RBC # BLD AUTO: 4.58 M/UL (ref 4.21–5.77)
SODIUM SERPL-SCNC: 140 MMOL/L (ref 136–145)
WBC OTHER # BLD: 4.4 K/UL (ref 3.5–11.3)

## 2024-06-17 PROCEDURE — 80051 ELECTROLYTE PANEL: CPT

## 2024-06-17 PROCEDURE — P9603 ONE-WAY ALLOW PRORATED MILES: HCPCS

## 2024-06-17 PROCEDURE — 85027 COMPLETE CBC AUTOMATED: CPT

## 2024-06-17 PROCEDURE — 80156 ASSAY CARBAMAZEPINE TOTAL: CPT

## 2024-06-17 PROCEDURE — 36415 COLL VENOUS BLD VENIPUNCTURE: CPT

## 2024-06-17 PROCEDURE — 84460 ALANINE AMINO (ALT) (SGPT): CPT

## 2024-06-17 PROCEDURE — 84450 TRANSFERASE (AST) (SGOT): CPT

## 2024-07-16 ENCOUNTER — HOSPITAL ENCOUNTER (OUTPATIENT)
Age: 49
Setting detail: SPECIMEN
Discharge: HOME | End: 2024-07-16
Payer: MEDICARE

## 2024-07-16 LAB — CARBAMAZEPINE SERPL-MCNC: 13 UG/ML (ref 4–12)

## 2024-07-16 PROCEDURE — 80156 ASSAY CARBAMAZEPINE TOTAL: CPT

## 2024-07-16 PROCEDURE — P9603 ONE-WAY ALLOW PRORATED MILES: HCPCS

## 2024-08-23 ENCOUNTER — HOSPITAL ENCOUNTER (OUTPATIENT)
Age: 49
Setting detail: SPECIMEN
Discharge: HOME | End: 2024-08-23

## 2024-08-27 ENCOUNTER — HOSPITAL ENCOUNTER (OUTPATIENT)
Age: 49
Setting detail: SPECIMEN
Discharge: HOME | End: 2024-08-27
Payer: MEDICARE

## 2024-08-27 LAB
ALT SERPL-CCNC: 24 U/L (ref 10–50)
ANION GAP SERPL CALCULATED.3IONS-SCNC: 12 MMOL/L (ref 9–16)
AST SERPL-CCNC: 33 U/L (ref 10–50)
CHLORIDE SERPL-SCNC: 103 MMOL/L (ref 98–107)
CO2 SERPL-SCNC: 26 MMOL/L (ref 20–31)
ERYTHROCYTE [DISTWIDTH] IN BLOOD BY AUTOMATED COUNT: 12.9 % (ref 11.8–14.4)
HCT VFR BLD AUTO: 45.2 % (ref 40.7–50.3)
HGB BLD-MCNC: 14.4 G/DL (ref 13–17)
MCH RBC QN AUTO: 29.5 PG (ref 25.2–33.5)
MCHC RBC AUTO-ENTMCNC: 31.9 G/DL (ref 28.4–34.8)
MCV RBC AUTO: 92.6 FL (ref 82.6–102.9)
NRBC BLD-RTO: 0 PER 100 WBC
PLATELET # BLD AUTO: 247 K/UL (ref 138–453)
PMV BLD AUTO: 11.4 FL (ref 8.1–13.5)
POTASSIUM SERPL-SCNC: 4.5 MMOL/L (ref 3.7–5.3)
RBC # BLD AUTO: 4.88 M/UL (ref 4.21–5.77)
SODIUM SERPL-SCNC: 141 MMOL/L (ref 136–145)
WBC OTHER # BLD: 5.9 K/UL (ref 3.5–11.3)

## 2024-08-27 PROCEDURE — 85027 COMPLETE CBC AUTOMATED: CPT

## 2024-08-27 PROCEDURE — 84460 ALANINE AMINO (ALT) (SGPT): CPT

## 2024-08-27 PROCEDURE — 80051 ELECTROLYTE PANEL: CPT

## 2024-08-27 PROCEDURE — P9603 ONE-WAY ALLOW PRORATED MILES: HCPCS

## 2024-08-27 PROCEDURE — 84450 TRANSFERASE (AST) (SGOT): CPT

## 2024-08-27 PROCEDURE — 36415 COLL VENOUS BLD VENIPUNCTURE: CPT

## 2024-09-09 ENCOUNTER — HOSPITAL ENCOUNTER (OUTPATIENT)
Age: 49
Setting detail: SPECIMEN
Discharge: HOME | End: 2024-09-09
Payer: MEDICARE

## 2024-09-09 LAB
CHOLEST SERPL-MCNC: 147 MG/DL (ref 0–199)
CHOLESTEROL/HDL RATIO: 3
EST. AVERAGE GLUCOSE BLD GHB EST-MCNC: 91 MG/DL
HBA1C MFR BLD: 4.8 % (ref 4–6)
HDLC SERPL-MCNC: 45 MG/DL
LDLC SERPL CALC-MCNC: 74 MG/DL (ref 0–100)
PSA SERPL-MCNC: 0.4 NG/ML (ref 0–4)
TRIGL SERPL-MCNC: 139 MG/DL (ref 0–149)
VLDLC SERPL CALC-MCNC: 28 MG/DL

## 2024-09-09 PROCEDURE — 80061 LIPID PANEL: CPT

## 2024-09-09 PROCEDURE — 36415 COLL VENOUS BLD VENIPUNCTURE: CPT

## 2024-09-09 PROCEDURE — 83036 HEMOGLOBIN GLYCOSYLATED A1C: CPT

## 2024-09-09 PROCEDURE — P9603 ONE-WAY ALLOW PRORATED MILES: HCPCS

## 2024-09-09 PROCEDURE — G0103 PSA SCREENING: HCPCS

## 2025-03-25 ENCOUNTER — HOSPITAL ENCOUNTER (OUTPATIENT)
Age: 50
Setting detail: SPECIMEN
Discharge: HOME | End: 2025-03-25
Payer: MEDICARE

## 2025-03-25 LAB
ALBUMIN SERPL-MCNC: 3.9 G/DL (ref 3.5–5.2)
ALBUMIN/GLOB SERPL: 1.4 {RATIO} (ref 1–2.5)
ALP SERPL-CCNC: 98 U/L (ref 40–129)
ALT SERPL-CCNC: 29 U/L (ref 10–50)
ANION GAP SERPL CALCULATED.3IONS-SCNC: 11 MMOL/L (ref 9–16)
AST SERPL-CCNC: 31 U/L (ref 10–50)
BILIRUB SERPL-MCNC: 0.2 MG/DL (ref 0–1.2)
BUN SERPL-MCNC: 14 MG/DL (ref 6–20)
CALCIUM SERPL-MCNC: 9.2 MG/DL (ref 8.6–10.4)
CARBAMAZEPINE SERPL-MCNC: 11.4 UG/ML (ref 4–12)
CHLORIDE SERPL-SCNC: 105 MMOL/L (ref 98–107)
CO2 SERPL-SCNC: 26 MMOL/L (ref 20–31)
CREAT SERPL-MCNC: 0.7 MG/DL (ref 0.7–1.2)
ERYTHROCYTE [DISTWIDTH] IN BLOOD BY AUTOMATED COUNT: 12.4 % (ref 11.8–14.4)
GFR, ESTIMATED: >90 ML/MIN/1.73M2
GLUCOSE SERPL-MCNC: 95 MG/DL (ref 74–99)
HCT VFR BLD AUTO: 45.2 % (ref 40.7–50.3)
HGB BLD-MCNC: 14.6 G/DL (ref 13–17)
MCH RBC QN AUTO: 30.1 PG (ref 25.2–33.5)
MCHC RBC AUTO-ENTMCNC: 32.3 G/DL (ref 28.4–34.8)
MCV RBC AUTO: 93.2 FL (ref 82.6–102.9)
NRBC BLD-RTO: 0 PER 100 WBC
PLATELET # BLD AUTO: 265 K/UL (ref 138–453)
PMV BLD AUTO: 10.6 FL (ref 8.1–13.5)
POTASSIUM SERPL-SCNC: 4.5 MMOL/L (ref 3.7–5.3)
PROT SERPL-MCNC: 6.8 G/DL (ref 6.6–8.7)
RBC # BLD AUTO: 4.85 M/UL (ref 4.21–5.77)
SODIUM SERPL-SCNC: 142 MMOL/L (ref 136–145)
WBC OTHER # BLD: 4.8 K/UL (ref 3.5–11.3)

## 2025-03-25 PROCEDURE — 80156 ASSAY CARBAMAZEPINE TOTAL: CPT

## 2025-03-25 PROCEDURE — 80053 COMPREHEN METABOLIC PANEL: CPT

## 2025-03-25 PROCEDURE — 36415 COLL VENOUS BLD VENIPUNCTURE: CPT

## 2025-03-25 PROCEDURE — 85027 COMPLETE CBC AUTOMATED: CPT

## 2025-03-26 ENCOUNTER — HOSPITAL ENCOUNTER (OUTPATIENT)
Age: 50
Setting detail: SPECIMEN
Discharge: HOME | End: 2025-03-26
Payer: MEDICARE

## 2025-03-26 PROCEDURE — 36415 COLL VENOUS BLD VENIPUNCTURE: CPT

## 2025-03-27 ENCOUNTER — HOSPITAL ENCOUNTER (OUTPATIENT)
Age: 50
Setting detail: SPECIMEN
Discharge: HOME | End: 2025-03-27
Payer: MEDICARE

## 2025-03-27 PROCEDURE — 36415 COLL VENOUS BLD VENIPUNCTURE: CPT

## 2025-03-28 ENCOUNTER — HOSPITAL ENCOUNTER (OUTPATIENT)
Age: 50
Setting detail: SPECIMEN
Discharge: HOME | End: 2025-03-28

## 2025-04-01 ENCOUNTER — HOSPITAL ENCOUNTER (OUTPATIENT)
Age: 50
Setting detail: SPECIMEN
Discharge: HOME | End: 2025-04-01
Payer: MEDICARE

## 2025-04-01 LAB
ALBUMIN SERPL-MCNC: 4 G/DL (ref 3.5–5.2)
ALBUMIN/GLOB SERPL: 1.5 {RATIO} (ref 1–2.5)
ALP SERPL-CCNC: 103 U/L (ref 40–129)
ALT SERPL-CCNC: 25 U/L (ref 10–50)
ANION GAP SERPL CALCULATED.3IONS-SCNC: 10 MMOL/L (ref 9–16)
AST SERPL-CCNC: 28 U/L (ref 10–50)
BILIRUB SERPL-MCNC: 0.3 MG/DL (ref 0–1.2)
BUN SERPL-MCNC: 12 MG/DL (ref 6–20)
CALCIUM SERPL-MCNC: 9 MG/DL (ref 8.6–10.4)
CHLORIDE SERPL-SCNC: 105 MMOL/L (ref 98–107)
CO2 SERPL-SCNC: 27 MMOL/L (ref 20–31)
CREAT SERPL-MCNC: 0.6 MG/DL (ref 0.7–1.2)
GFR, ESTIMATED: >90 ML/MIN/1.73M2
GLUCOSE SERPL-MCNC: 110 MG/DL (ref 74–99)
PHOSPHATE SERPL-MCNC: 2.7 MG/DL (ref 2.5–4.5)
POTASSIUM SERPL-SCNC: 3.8 MMOL/L (ref 3.7–5.3)
PROT SERPL-MCNC: 6.7 G/DL (ref 6.6–8.7)
SODIUM SERPL-SCNC: 143 MMOL/L (ref 136–145)

## 2025-04-01 PROCEDURE — 84100 ASSAY OF PHOSPHORUS: CPT

## 2025-04-01 PROCEDURE — 80053 COMPREHEN METABOLIC PANEL: CPT

## 2025-04-01 PROCEDURE — 36415 COLL VENOUS BLD VENIPUNCTURE: CPT
